# Patient Record
Sex: MALE | Race: WHITE | NOT HISPANIC OR LATINO | Employment: OTHER | URBAN - METROPOLITAN AREA
[De-identification: names, ages, dates, MRNs, and addresses within clinical notes are randomized per-mention and may not be internally consistent; named-entity substitution may affect disease eponyms.]

---

## 2018-01-07 ENCOUNTER — HOSPITAL ENCOUNTER (INPATIENT)
Facility: MEDICAL CENTER | Age: 53
LOS: 4 days | DRG: 193 | End: 2018-01-11
Attending: EMERGENCY MEDICINE | Admitting: HOSPITALIST
Payer: MEDICARE

## 2018-01-07 ENCOUNTER — RESOLUTE PROFESSIONAL BILLING HOSPITAL PROF FEE (OUTPATIENT)
Dept: HOSPITALIST | Facility: MEDICAL CENTER | Age: 53
End: 2018-01-07
Payer: MEDICARE

## 2018-01-07 ENCOUNTER — APPOINTMENT (OUTPATIENT)
Dept: RADIOLOGY | Facility: MEDICAL CENTER | Age: 53
DRG: 193 | End: 2018-01-07
Attending: EMERGENCY MEDICINE
Payer: MEDICARE

## 2018-01-07 DIAGNOSIS — E10.10 DIABETIC KETOACIDOSIS WITHOUT COMA ASSOCIATED WITH TYPE 1 DIABETES MELLITUS (HCC): ICD-10-CM

## 2018-01-07 DIAGNOSIS — J40 BRONCHITIS: ICD-10-CM

## 2018-01-07 DIAGNOSIS — R05.9 COUGH: ICD-10-CM

## 2018-01-07 PROBLEM — J06.9 VIRAL UPPER RESPIRATORY ILLNESS: Status: ACTIVE | Noted: 2018-01-07

## 2018-01-07 PROBLEM — R09.02 HYPOXIA: Status: ACTIVE | Noted: 2018-01-07

## 2018-01-07 LAB
ALBUMIN SERPL BCP-MCNC: 3.8 G/DL (ref 3.2–4.9)
ALBUMIN/GLOB SERPL: 1.1 G/DL
ALP SERPL-CCNC: 72 U/L (ref 30–99)
ALT SERPL-CCNC: 6 U/L (ref 2–50)
ANION GAP SERPL CALC-SCNC: 17 MMOL/L (ref 0–11.9)
AST SERPL-CCNC: 17 U/L (ref 12–45)
B-OH-BUTYR SERPL-MCNC: 5.56 MMOL/L (ref 0.02–0.27)
BASOPHILS # BLD AUTO: 0.3 % (ref 0–1.8)
BASOPHILS # BLD: 0.02 K/UL (ref 0–0.12)
BILIRUB SERPL-MCNC: 0.6 MG/DL (ref 0.1–1.5)
BNP SERPL-MCNC: 9 PG/ML (ref 0–100)
BUN SERPL-MCNC: 17 MG/DL (ref 8–22)
CALCIUM SERPL-MCNC: 9 MG/DL (ref 8.5–10.5)
CHLORIDE SERPL-SCNC: 96 MMOL/L (ref 96–112)
CO2 SERPL-SCNC: 18 MMOL/L (ref 20–33)
CREAT SERPL-MCNC: 1.02 MG/DL (ref 0.5–1.4)
EKG IMPRESSION: NORMAL
EOSINOPHIL # BLD AUTO: 0.04 K/UL (ref 0–0.51)
EOSINOPHIL NFR BLD: 0.6 % (ref 0–6.9)
ERYTHROCYTE [DISTWIDTH] IN BLOOD BY AUTOMATED COUNT: 44.1 FL (ref 35.9–50)
GFR SERPL CREATININE-BSD FRML MDRD: >60 ML/MIN/1.73 M 2
GLOBULIN SER CALC-MCNC: 3.5 G/DL (ref 1.9–3.5)
GLUCOSE SERPL-MCNC: 190 MG/DL (ref 65–99)
HCT VFR BLD AUTO: 46.7 % (ref 42–52)
HGB BLD-MCNC: 15.6 G/DL (ref 14–18)
IMM GRANULOCYTES # BLD AUTO: 0.04 K/UL (ref 0–0.11)
IMM GRANULOCYTES NFR BLD AUTO: 0.6 % (ref 0–0.9)
LYMPHOCYTES # BLD AUTO: 0.85 K/UL (ref 1–4.8)
LYMPHOCYTES NFR BLD: 12.4 % (ref 22–41)
MCH RBC QN AUTO: 28.4 PG (ref 27–33)
MCHC RBC AUTO-ENTMCNC: 33.4 G/DL (ref 33.7–35.3)
MCV RBC AUTO: 85.1 FL (ref 81.4–97.8)
MONOCYTES # BLD AUTO: 0.64 K/UL (ref 0–0.85)
MONOCYTES NFR BLD AUTO: 9.4 % (ref 0–13.4)
NEUTROPHILS # BLD AUTO: 5.25 K/UL (ref 1.82–7.42)
NEUTROPHILS NFR BLD: 76.7 % (ref 44–72)
NRBC # BLD AUTO: 0 K/UL
NRBC BLD-RTO: 0 /100 WBC
PLATELET # BLD AUTO: 321 K/UL (ref 164–446)
PMV BLD AUTO: 9.5 FL (ref 9–12.9)
POTASSIUM SERPL-SCNC: 4.1 MMOL/L (ref 3.6–5.5)
PROT SERPL-MCNC: 7.3 G/DL (ref 6–8.2)
RBC # BLD AUTO: 5.49 M/UL (ref 4.7–6.1)
SODIUM SERPL-SCNC: 131 MMOL/L (ref 135–145)
TROPONIN I SERPL-MCNC: <0.01 NG/ML (ref 0–0.04)
WBC # BLD AUTO: 6.8 K/UL (ref 4.8–10.8)

## 2018-01-07 PROCEDURE — 93005 ELECTROCARDIOGRAM TRACING: CPT

## 2018-01-07 PROCEDURE — 99223 1ST HOSP IP/OBS HIGH 75: CPT | Mod: AI | Performed by: HOSPITALIST

## 2018-01-07 PROCEDURE — 80053 COMPREHEN METABOLIC PANEL: CPT

## 2018-01-07 PROCEDURE — 84484 ASSAY OF TROPONIN QUANT: CPT

## 2018-01-07 PROCEDURE — 71045 X-RAY EXAM CHEST 1 VIEW: CPT

## 2018-01-07 PROCEDURE — 770001 HCHG ROOM/CARE - MED/SURG/GYN PRIV*

## 2018-01-07 PROCEDURE — 80048 BASIC METABOLIC PNL TOTAL CA: CPT

## 2018-01-07 PROCEDURE — 82010 KETONE BODYS QUAN: CPT

## 2018-01-07 PROCEDURE — 85025 COMPLETE CBC W/AUTO DIFF WBC: CPT

## 2018-01-07 PROCEDURE — 700111 HCHG RX REV CODE 636 W/ 250 OVERRIDE (IP): Performed by: EMERGENCY MEDICINE

## 2018-01-07 PROCEDURE — 96374 THER/PROPH/DIAG INJ IV PUSH: CPT

## 2018-01-07 PROCEDURE — 87040 BLOOD CULTURE FOR BACTERIA: CPT | Mod: 91

## 2018-01-07 PROCEDURE — 700105 HCHG RX REV CODE 258: Performed by: EMERGENCY MEDICINE

## 2018-01-07 PROCEDURE — 93005 ELECTROCARDIOGRAM TRACING: CPT | Performed by: EMERGENCY MEDICINE

## 2018-01-07 PROCEDURE — 96361 HYDRATE IV INFUSION ADD-ON: CPT

## 2018-01-07 PROCEDURE — 99285 EMERGENCY DEPT VISIT HI MDM: CPT

## 2018-01-07 PROCEDURE — 83880 ASSAY OF NATRIURETIC PEPTIDE: CPT

## 2018-01-07 PROCEDURE — 71045 X-RAY EXAM CHEST 1 VIEW: CPT | Performed by: EMERGENCY MEDICINE

## 2018-01-07 PROCEDURE — 87502 INFLUENZA DNA AMP PROBE: CPT

## 2018-01-07 RX ORDER — ENALAPRILAT 1.25 MG/ML
1.25 INJECTION INTRAVENOUS EVERY 6 HOURS PRN
Status: DISCONTINUED | OUTPATIENT
Start: 2018-01-07 | End: 2018-01-11 | Stop reason: HOSPADM

## 2018-01-07 RX ORDER — ONDANSETRON 2 MG/ML
4 INJECTION INTRAMUSCULAR; INTRAVENOUS ONCE
Status: COMPLETED | OUTPATIENT
Start: 2018-01-07 | End: 2018-01-07

## 2018-01-07 RX ORDER — FAMOTIDINE 20 MG/1
20 TABLET, FILM COATED ORAL 2 TIMES DAILY
Status: DISCONTINUED | OUTPATIENT
Start: 2018-01-08 | End: 2018-01-11 | Stop reason: HOSPADM

## 2018-01-07 RX ORDER — SODIUM CHLORIDE 9 MG/ML
1000 INJECTION, SOLUTION INTRAVENOUS ONCE
Status: COMPLETED | OUTPATIENT
Start: 2018-01-07 | End: 2018-01-07

## 2018-01-07 RX ORDER — DEXTROSE MONOHYDRATE 25 G/50ML
25 INJECTION, SOLUTION INTRAVENOUS
Status: DISCONTINUED | OUTPATIENT
Start: 2018-01-07 | End: 2018-01-09 | Stop reason: ALTCHOICE

## 2018-01-07 RX ORDER — ACETAMINOPHEN 325 MG/1
650 TABLET ORAL EVERY 6 HOURS PRN
Status: DISCONTINUED | OUTPATIENT
Start: 2018-01-07 | End: 2018-01-08

## 2018-01-07 RX ORDER — POLYETHYLENE GLYCOL 3350 17 G/17G
1 POWDER, FOR SOLUTION ORAL
Status: DISCONTINUED | OUTPATIENT
Start: 2018-01-07 | End: 2018-01-11 | Stop reason: HOSPADM

## 2018-01-07 RX ORDER — SODIUM CHLORIDE 9 MG/ML
INJECTION, SOLUTION INTRAVENOUS CONTINUOUS
Status: DISCONTINUED | OUTPATIENT
Start: 2018-01-08 | End: 2018-01-10 | Stop reason: ALTCHOICE

## 2018-01-07 RX ORDER — BISACODYL 10 MG
10 SUPPOSITORY, RECTAL RECTAL
Status: DISCONTINUED | OUTPATIENT
Start: 2018-01-07 | End: 2018-01-11 | Stop reason: HOSPADM

## 2018-01-07 RX ORDER — AMOXICILLIN 250 MG
2 CAPSULE ORAL 2 TIMES DAILY
Status: DISCONTINUED | OUTPATIENT
Start: 2018-01-08 | End: 2018-01-11 | Stop reason: HOSPADM

## 2018-01-07 RX ORDER — OXYCODONE HYDROCHLORIDE AND ACETAMINOPHEN 5; 325 MG/1; MG/1
1-2 TABLET ORAL EVERY 4 HOURS PRN
Status: DISCONTINUED | OUTPATIENT
Start: 2018-01-07 | End: 2018-01-08

## 2018-01-07 RX ORDER — GUAIFENESIN/DEXTROMETHORPHAN 100-10MG/5
10 SYRUP ORAL EVERY 6 HOURS PRN
Status: DISCONTINUED | OUTPATIENT
Start: 2018-01-07 | End: 2018-01-11 | Stop reason: HOSPADM

## 2018-01-07 RX ORDER — ONDANSETRON 2 MG/ML
4 INJECTION INTRAMUSCULAR; INTRAVENOUS EVERY 4 HOURS PRN
Status: DISCONTINUED | OUTPATIENT
Start: 2018-01-07 | End: 2018-01-11 | Stop reason: HOSPADM

## 2018-01-07 RX ORDER — CARISOPRODOL 350 MG/1
350 TABLET ORAL EVERY 8 HOURS PRN
Status: DISCONTINUED | OUTPATIENT
Start: 2018-01-07 | End: 2018-01-08

## 2018-01-07 RX ORDER — AZITHROMYCIN 250 MG/1
500 TABLET, FILM COATED ORAL DAILY
Status: DISCONTINUED | OUTPATIENT
Start: 2018-01-08 | End: 2018-01-11 | Stop reason: HOSPADM

## 2018-01-07 RX ORDER — ENALAPRIL MALEATE 20 MG/1
20 TABLET ORAL DAILY
Status: DISCONTINUED | OUTPATIENT
Start: 2018-01-08 | End: 2018-01-08

## 2018-01-07 RX ORDER — INSULIN GLARGINE 100 [IU]/ML
40 INJECTION, SOLUTION SUBCUTANEOUS DAILY
Status: DISCONTINUED | OUTPATIENT
Start: 2018-01-08 | End: 2018-01-08

## 2018-01-07 RX ADMIN — ONDANSETRON 4 MG: 2 INJECTION INTRAMUSCULAR; INTRAVENOUS at 22:31

## 2018-01-07 RX ADMIN — SODIUM CHLORIDE 1000 ML: 9 INJECTION, SOLUTION INTRAVENOUS at 22:31

## 2018-01-07 ASSESSMENT — PAIN SCALES - GENERAL: PAINLEVEL_OUTOF10: 6

## 2018-01-08 PROBLEM — J10.1 INFLUENZA B: Status: ACTIVE | Noted: 2018-01-07

## 2018-01-08 PROBLEM — J96.01 ACUTE RESPIRATORY FAILURE WITH HYPOXIA (HCC): Status: ACTIVE | Noted: 2018-01-07

## 2018-01-08 LAB
ANION GAP SERPL CALC-SCNC: 15 MMOL/L (ref 0–11.9)
ANION GAP SERPL CALC-SCNC: 17 MMOL/L (ref 0–11.9)
BUN SERPL-MCNC: 17 MG/DL (ref 8–22)
BUN SERPL-MCNC: 19 MG/DL (ref 8–22)
CALCIUM SERPL-MCNC: 7.8 MG/DL (ref 8.5–10.5)
CALCIUM SERPL-MCNC: 8 MG/DL (ref 8.5–10.5)
CHLORIDE SERPL-SCNC: 101 MMOL/L (ref 96–112)
CHLORIDE SERPL-SCNC: 103 MMOL/L (ref 96–112)
CO2 SERPL-SCNC: 14 MMOL/L (ref 20–33)
CO2 SERPL-SCNC: 17 MMOL/L (ref 20–33)
CREAT SERPL-MCNC: 0.79 MG/DL (ref 0.5–1.4)
CREAT SERPL-MCNC: 0.94 MG/DL (ref 0.5–1.4)
ERYTHROCYTE [DISTWIDTH] IN BLOOD BY AUTOMATED COUNT: 44.2 FL (ref 35.9–50)
EST. AVERAGE GLUCOSE BLD GHB EST-MCNC: 249 MG/DL
FLUAV RNA SPEC QL NAA+PROBE: NEGATIVE
FLUBV RNA SPEC QL NAA+PROBE: POSITIVE
GFR SERPL CREATININE-BSD FRML MDRD: >60 ML/MIN/1.73 M 2
GFR SERPL CREATININE-BSD FRML MDRD: >60 ML/MIN/1.73 M 2
GLUCOSE BLD-MCNC: 103 MG/DL (ref 65–99)
GLUCOSE BLD-MCNC: 179 MG/DL (ref 65–99)
GLUCOSE BLD-MCNC: 181 MG/DL (ref 65–99)
GLUCOSE BLD-MCNC: 192 MG/DL (ref 65–99)
GLUCOSE BLD-MCNC: 202 MG/DL (ref 65–99)
GLUCOSE SERPL-MCNC: 194 MG/DL (ref 65–99)
GLUCOSE SERPL-MCNC: 214 MG/DL (ref 65–99)
HBA1C MFR BLD: 10.3 % (ref 0–5.6)
HCT VFR BLD AUTO: 43.9 % (ref 42–52)
HGB BLD-MCNC: 14.2 G/DL (ref 14–18)
MCH RBC QN AUTO: 27.4 PG (ref 27–33)
MCHC RBC AUTO-ENTMCNC: 32.3 G/DL (ref 33.7–35.3)
MCV RBC AUTO: 84.6 FL (ref 81.4–97.8)
PLATELET # BLD AUTO: 289 K/UL (ref 164–446)
PMV BLD AUTO: 9.3 FL (ref 9–12.9)
POTASSIUM SERPL-SCNC: 3.9 MMOL/L (ref 3.6–5.5)
POTASSIUM SERPL-SCNC: 3.9 MMOL/L (ref 3.6–5.5)
RBC # BLD AUTO: 5.19 M/UL (ref 4.7–6.1)
SODIUM SERPL-SCNC: 133 MMOL/L (ref 135–145)
SODIUM SERPL-SCNC: 134 MMOL/L (ref 135–145)
WBC # BLD AUTO: 9.3 K/UL (ref 4.8–10.8)

## 2018-01-08 PROCEDURE — 85027 COMPLETE CBC AUTOMATED: CPT

## 2018-01-08 PROCEDURE — 90686 IIV4 VACC NO PRSV 0.5 ML IM: CPT | Performed by: HOSPITALIST

## 2018-01-08 PROCEDURE — 770001 HCHG ROOM/CARE - MED/SURG/GYN PRIV*

## 2018-01-08 PROCEDURE — 3E01340 INTRODUCTION OF INFLUENZA VACCINE INTO SUBCUTANEOUS TISSUE, PERCUTANEOUS APPROACH: ICD-10-PCS | Performed by: HOSPITALIST

## 2018-01-08 PROCEDURE — 700102 HCHG RX REV CODE 250 W/ 637 OVERRIDE(OP): Performed by: EMERGENCY MEDICINE

## 2018-01-08 PROCEDURE — A9270 NON-COVERED ITEM OR SERVICE: HCPCS | Performed by: HOSPITALIST

## 2018-01-08 PROCEDURE — A9270 NON-COVERED ITEM OR SERVICE: HCPCS | Performed by: EMERGENCY MEDICINE

## 2018-01-08 PROCEDURE — 80048 BASIC METABOLIC PNL TOTAL CA: CPT

## 2018-01-08 PROCEDURE — 99233 SBSQ HOSP IP/OBS HIGH 50: CPT | Performed by: INTERNAL MEDICINE

## 2018-01-08 PROCEDURE — 36415 COLL VENOUS BLD VENIPUNCTURE: CPT

## 2018-01-08 PROCEDURE — 700105 HCHG RX REV CODE 258: Performed by: INTERNAL MEDICINE

## 2018-01-08 PROCEDURE — 700111 HCHG RX REV CODE 636 W/ 250 OVERRIDE (IP): Performed by: HOSPITALIST

## 2018-01-08 PROCEDURE — A9270 NON-COVERED ITEM OR SERVICE: HCPCS | Performed by: INTERNAL MEDICINE

## 2018-01-08 PROCEDURE — 700105 HCHG RX REV CODE 258: Performed by: HOSPITALIST

## 2018-01-08 PROCEDURE — 83036 HEMOGLOBIN GLYCOSYLATED A1C: CPT

## 2018-01-08 PROCEDURE — 700102 HCHG RX REV CODE 250 W/ 637 OVERRIDE(OP): Performed by: HOSPITALIST

## 2018-01-08 PROCEDURE — 90471 IMMUNIZATION ADMIN: CPT

## 2018-01-08 PROCEDURE — 700102 HCHG RX REV CODE 250 W/ 637 OVERRIDE(OP): Performed by: INTERNAL MEDICINE

## 2018-01-08 PROCEDURE — 82962 GLUCOSE BLOOD TEST: CPT

## 2018-01-08 RX ORDER — INSULIN GLARGINE 100 [IU]/ML
15 INJECTION, SOLUTION SUBCUTANEOUS DAILY
Status: DISCONTINUED | OUTPATIENT
Start: 2018-01-08 | End: 2018-01-08

## 2018-01-08 RX ORDER — BENZONATATE 100 MG/1
100 CAPSULE ORAL 3 TIMES DAILY PRN
Status: DISCONTINUED | OUTPATIENT
Start: 2018-01-08 | End: 2018-01-11 | Stop reason: HOSPADM

## 2018-01-08 RX ORDER — ALUMINA, MAGNESIA, AND SIMETHICONE 2400; 2400; 240 MG/30ML; MG/30ML; MG/30ML
10 SUSPENSION ORAL 4 TIMES DAILY PRN
Status: DISCONTINUED | OUTPATIENT
Start: 2018-01-08 | End: 2018-01-11 | Stop reason: HOSPADM

## 2018-01-08 RX ORDER — GUAIFENESIN 600 MG/1
600 TABLET, EXTENDED RELEASE ORAL EVERY 12 HOURS
Status: DISCONTINUED | OUTPATIENT
Start: 2018-01-08 | End: 2018-01-11 | Stop reason: HOSPADM

## 2018-01-08 RX ORDER — INSULIN GLARGINE 100 [IU]/ML
20 INJECTION, SOLUTION SUBCUTANEOUS DAILY
Status: DISCONTINUED | OUTPATIENT
Start: 2018-01-09 | End: 2018-01-09

## 2018-01-08 RX ORDER — BENZONATATE 100 MG/1
100 CAPSULE ORAL 3 TIMES DAILY PRN
Status: DISCONTINUED | OUTPATIENT
Start: 2018-01-08 | End: 2018-01-08

## 2018-01-08 RX ORDER — OSELTAMIVIR PHOSPHATE 75 MG/1
75 CAPSULE ORAL EVERY 12 HOURS
Status: DISCONTINUED | OUTPATIENT
Start: 2018-01-08 | End: 2018-01-11 | Stop reason: HOSPADM

## 2018-01-08 RX ORDER — OXYCODONE HYDROCHLORIDE 5 MG/1
2.5-5 TABLET ORAL EVERY 4 HOURS PRN
Status: DISCONTINUED | OUTPATIENT
Start: 2018-01-08 | End: 2018-01-11 | Stop reason: HOSPADM

## 2018-01-08 RX ADMIN — ALUMINUM HYDROXIDE, MAGNESIUM HYDROXIDE,SIMETHICONE 10 ML: 400; 400; 40 LIQUID ORAL at 04:25

## 2018-01-08 RX ADMIN — GUAIFENESIN 600 MG: 600 TABLET, EXTENDED RELEASE ORAL at 09:00

## 2018-01-08 RX ADMIN — OSELTAMIVIR PHOSPHATE 75 MG: 75 CAPSULE ORAL at 03:01

## 2018-01-08 RX ADMIN — OXYCODONE HYDROCHLORIDE 5 MG: 5 TABLET ORAL at 18:14

## 2018-01-08 RX ADMIN — GUAIFENESIN AND DEXTROMETHORPHAN 10 ML: 100; 10 SYRUP ORAL at 13:24

## 2018-01-08 RX ADMIN — FAMOTIDINE 20 MG: 20 TABLET, FILM COATED ORAL at 07:40

## 2018-01-08 RX ADMIN — ONDANSETRON 4 MG: 2 INJECTION INTRAMUSCULAR; INTRAVENOUS at 04:18

## 2018-01-08 RX ADMIN — GUAIFENESIN 600 MG: 600 TABLET, EXTENDED RELEASE ORAL at 21:00

## 2018-01-08 RX ADMIN — ONDANSETRON 4 MG: 2 INJECTION INTRAMUSCULAR; INTRAVENOUS at 20:39

## 2018-01-08 RX ADMIN — INFLUENZA A VIRUS A/MICHIGAN/45/2015 X-275 (H1N1) ANTIGEN (FORMALDEHYDE INACTIVATED), INFLUENZA A VIRUS A/HONG KONG/4801/2014 X-263B (H3N2) ANTIGEN (FORMALDEHYDE INACTIVATED), INFLUENZA B VIRUS B/PHUKET/3073/2013 ANTIGEN (FORMALDEHYDE INACTIVATED), AND INFLUENZA B VIRUS B/BRISBANE/60/2008 ANTIGEN (FORMALDEHYDE INACTIVATED) 0.5 ML: 15; 15; 15; 15 INJECTION, SUSPENSION INTRAMUSCULAR at 07:40

## 2018-01-08 RX ADMIN — SODIUM CHLORIDE: 9 INJECTION, SOLUTION INTRAVENOUS at 18:15

## 2018-01-08 RX ADMIN — OXYCODONE HYDROCHLORIDE 5 MG: 5 TABLET ORAL at 13:23

## 2018-01-08 RX ADMIN — OXYCODONE HYDROCHLORIDE 5 MG: 5 TABLET ORAL at 22:14

## 2018-01-08 RX ADMIN — INSULIN GLARGINE 15 UNITS: 100 INJECTION, SOLUTION SUBCUTANEOUS at 09:54

## 2018-01-08 RX ADMIN — SODIUM CHLORIDE: 9 INJECTION, SOLUTION INTRAVENOUS at 11:32

## 2018-01-08 RX ADMIN — INSULIN HUMAN 2 UNITS: 100 INJECTION, SOLUTION PARENTERAL at 06:49

## 2018-01-08 RX ADMIN — ONDANSETRON 4 MG: 2 INJECTION INTRAMUSCULAR; INTRAVENOUS at 08:00

## 2018-01-08 RX ADMIN — OXYCODONE HYDROCHLORIDE 5 MG: 5 TABLET ORAL at 08:40

## 2018-01-08 RX ADMIN — INSULIN HUMAN 3 UNITS: 100 INJECTION, SOLUTION PARENTERAL at 16:16

## 2018-01-08 RX ADMIN — ONDANSETRON 4 MG: 2 INJECTION INTRAMUSCULAR; INTRAVENOUS at 16:12

## 2018-01-08 RX ADMIN — GUAIFENESIN AND DEXTROMETHORPHAN 10 ML: 100; 10 SYRUP ORAL at 22:28

## 2018-01-08 RX ADMIN — ALUMINUM HYDROXIDE, MAGNESIUM HYDROXIDE,SIMETHICONE 10 ML: 400; 400; 40 LIQUID ORAL at 01:50

## 2018-01-08 RX ADMIN — ENOXAPARIN SODIUM 40 MG: 100 INJECTION SUBCUTANEOUS at 07:40

## 2018-01-08 RX ADMIN — SODIUM CHLORIDE: 9 INJECTION, SOLUTION INTRAVENOUS at 03:09

## 2018-01-08 RX ADMIN — OSELTAMIVIR PHOSPHATE 75 MG: 75 CAPSULE ORAL at 21:00

## 2018-01-08 RX ADMIN — OSELTAMIVIR PHOSPHATE 75 MG: 75 CAPSULE ORAL at 08:40

## 2018-01-08 RX ADMIN — FAMOTIDINE 20 MG: 20 TABLET, FILM COATED ORAL at 21:58

## 2018-01-08 RX ADMIN — AZITHROMYCIN 500 MG: 250 TABLET, FILM COATED ORAL at 09:00

## 2018-01-08 ASSESSMENT — LIFESTYLE VARIABLES
EVER_SMOKED: YES
ALCOHOL_USE: NO
DO YOU DRINK ALCOHOL: NO
EVER_SMOKED: YES

## 2018-01-08 ASSESSMENT — ENCOUNTER SYMPTOMS
DEPRESSION: 0
BACK PAIN: 0
SORE THROAT: 1
ABDOMINAL PAIN: 0
CHILLS: 1
FEVER: 0
NAUSEA: 1
CHOKING: 1
SPUTUM PRODUCTION: 1
SHORTNESS OF BREATH: 1
DIZZINESS: 0
MYALGIAS: 0
NAUSEA: 0
DIARRHEA: 0
FATIGUE: 1
HEADACHES: 1
FEVER: 1
BLURRED VISION: 0
COUGH: 1

## 2018-01-08 ASSESSMENT — COPD QUESTIONNAIRES
HAVE YOU SMOKED AT LEAST 100 CIGARETTES IN YOUR ENTIRE LIFE: YES
DO YOU EVER COUGH UP ANY MUCUS OR PHLEGM?: YES, A FEW DAYS A WEEK OR MONTH
COPD SCREENING SCORE: 4
DURING THE PAST 4 WEEKS HOW MUCH DID YOU FEEL SHORT OF BREATH: NONE/LITTLE OF THE TIME

## 2018-01-08 ASSESSMENT — PAIN SCALES - GENERAL
PAINLEVEL_OUTOF10: 4
PAINLEVEL_OUTOF10: 6
PAINLEVEL_OUTOF10: 6
PAINLEVEL_OUTOF10: 4
PAINLEVEL_OUTOF10: 7
PAINLEVEL_OUTOF10: 6

## 2018-01-08 NOTE — PROGRESS NOTES
Aox4. Currently on 2L O2 at 95%. Positive for SOB. Nauseous. Vomited once. Zofran given. Pt complaining upset stomach. Pt claims he has hx of GERD and takes ranitidine. Maalox given per order with some relief but does not last long. Reports body aches. Refuses to take anything for pain other than aspirin due to his liver issue. BLE amputation noted. Pt reports ability to ambulate steady using prosthetics. Two RN skin check with Torri. BLE stumps are pink but blanching. Other skin areas intact.

## 2018-01-08 NOTE — ED NOTES
Critical lab value received from microbiology:    Influenza B positive    Droplet precautions initiated.

## 2018-01-08 NOTE — DIETARY
"Nutrition Services:      Unintentional weight loss PTA on Nutrition Admit Screen. Pt is currently on a Diabetic diet and pt denied any recent weight changes. Noted per chart review that pt's weight from previous admit 3 years ago is 61 kg, which indicates the pt's weight is stable.      Ht: 5'8\", Wt: 60 kg, BMI 20.  Consult RD as needed. RD will re-screen weekly.      RD available prn     "

## 2018-01-08 NOTE — ED NOTES
"Pt amb to triage.  Chief Complaint   Patient presents with   • Shortness of Breath     x3d   • Cough     x3d, barky   • Chest Wall Pain     \"It hurts when I breathe, I cannot take a deep breath\"   • Medication Refill     New to [facility], requesting rx for insulin and needle tips for insulin pen     Pt speaking in complete sentences. Mask provided.  "

## 2018-01-08 NOTE — ED PROVIDER NOTES
"ED Provider Note  ED Provider Note          CHIEF COMPLAINT  Chief Complaint   Patient presents with   • Shortness of Breath     x3d   • Cough     x3d, barky   • Chest Wall Pain     \"It hurts when I breathe, I cannot take a deep breath\"   • Medication Refill     New to [facility], requesting rx for insulin and needle tips for insulin pen       HPI  Juvenal Boone is a 52 y.o. male who presents to the Emergency Department For concern of a productive cough for about 3 days with associated shortness of breath. He has diffuse bodyaches and chest wall pain. He is a type I diabetic and is been running low on his insulin as well. He says he has congestion, sore throat. He says his sugars are normally very well-controlled. He said he has not had DKA in 40 years. He says he is now starting to feel little nauseous spell when asking for a bag to throw up in.    REVIEW OF SYSTEMS  Review of Systems   Constitutional: Positive for fatigue. Negative for fever.   HENT: Positive for congestion and sore throat.    Respiratory: Positive for choking and shortness of breath.    Gastrointestinal: Positive for nausea. Negative for abdominal pain.   Endocrine:        H/oDM   Genitourinary: Negative for difficulty urinating.   Musculoskeletal: Negative for back pain.   Skin: Negative for rash.   Neurological: Negative for dizziness.       PAST MEDICAL HISTORY   has a past medical history of Crush injury of leg; Diabetes; Gastroparesis; Osteomyelitis; and Traumatic amputation of leg(s) (complete) (partial), unilateral, below knee, without mention of complication.    SURGICAL HISTORY   has a past surgical history that includes cholecystectomy; other orthopedic surgery; and other.    SOCIAL HISTORY  Social History   Substance Use Topics   • Smoking status: Former Smoker     Years: 0.50     Quit date: 1/29/1988   • Smokeless tobacco: Not on file   • Alcohol use No      History   Drug Use No       FAMILY HISTORY  No family history on " "file.    CURRENT MEDICATIONS  Reviewed.  See Encounter Summary.     ALLERGIES  Allergies   Allergen Reactions   • Ativan Itching   • Benadryl [Altaryl]      \"make my skin crawl\"   • Demerol    • Fentanyl Anxiety     \"I go crazy\".    • Morphine      \"I go in to severe anaphylactic shock, I stop breathing\".   • Other Misc      \"I'm allergic to all pain medications except dilaudid and plain oxycodone\"   • Phenergan [Promethazine Hcl]      \"make my skin crawl\"   • Reglan [Metoclopramide Hcl]      \"involuntary tongue movements\"   • Toradol Rash and Itching   • Tylenol Itching       PHYSICAL EXAM  VITAL SIGNS: /85   Pulse 94   Temp 36.5 °C (97.7 °F) (Temporal)   Resp (!) 23   Ht 1.727 m (5' 8\")   Wt 60 kg (132 lb 4.4 oz)   SpO2 96%   BMI 20.11 kg/m²   Physical Exam   Constitutional: He is oriented to person, place, and time.   HENT:   Head: Normocephalic and atraumatic.   Eyes: Pupils are equal, round, and reactive to light.   Neck: Normal range of motion.   Cardiovascular: Normal rate.    Pulmonary/Chest: Effort normal and breath sounds normal.   Abdominal: Soft. Bowel sounds are normal.   Musculoskeletal: He exhibits no deformity.   Neurological: He is alert and oriented to person, place, and time.   Skin: Skin is warm. He is not diaphoretic.   Psychiatric: He has a normal mood and affect.           DIAGNOSTIC STUDIES / PROCEDURES     LABS  Labs Reviewed   CBC WITH DIFFERENTIAL - Abnormal; Notable for the following:        Result Value    MCHC 33.4 (*)     Neutrophils-Polys 76.70 (*)     Lymphocytes 12.40 (*)     Lymphs (Absolute) 0.85 (*)     All other components within normal limits   COMP METABOLIC PANEL - Abnormal; Notable for the following:     Sodium 131 (*)     Co2 18 (*)     Anion Gap 17.0 (*)     Glucose 190 (*)     All other components within normal limits   BASIC METABOLIC PANEL - Abnormal; Notable for the following:     Sodium 133 (*)     Co2 17 (*)     Glucose 194 (*)     Calcium 8.0 (*)     " "Anion Gap 15.0 (*)     All other components within normal limits    Narrative:     Repeat after IVF  This quantitative reference lab test has a TAT of 24-48  hours. Please order Beta-Hydroxybutyric Acid ([id number]) for  STAT ketone quantitation performed at [facility]   INFLUENZA A/B BY PCR - Abnormal; Notable for the following:     Influenza virus B, PCR POSITIVE (*)     All other components within normal limits   BETA-HYDROXYBUTYRIC ACID - Abnormal; Notable for the following:     beta-Hydroxybutyric Acid 5.56 (*)     All other components within normal limits    Narrative:     Repeat after IVF  This quantitative reference lab test has a TAT of 24-48  hours. Please order Beta-Hydroxybutyric Acid ([id number]) for  STAT ketone quantitation performed at [facility]   BTYPE NATRIURETIC PEPTIDE   BLOOD CULTURE    Narrative:     Per Hospital Policy: Only change Specimen Src: to \"Line\" if  specified by physician order.   TROPONIN   ESTIMATED GFR   ESTIMATED GFR    Narrative:     Repeat after IVF  This quantitative reference lab test has a TAT of 24-48  hours. Please order Beta-Hydroxybutyric Acid ([id number]) for  STAT ketone quantitation performed at [facility]   BLOOD CULTURE   CBC WITHOUT DIFFERENTIAL   BASIC METABOLIC PANEL   HEMOGLOBIN A1C       All labs were reviewed by me.        RADIOLOGY  DX-CHEST-LIMITED (1 VIEW)   Final Result      No acute cardiopulmonary disease.        The radiologist's interpretation of all radiological studies have been reviewed by me.    COURSE & MEDICAL DECISION MAKING  Pertinent Labs & Imaging studies reviewed. (See chart for details)    10:17 PM - Patient seen and examined at bedside.       Decision Making:  This is a 52 y.o. year old male who presents with Concern of a cough, congestion and other upper respiratory like symptoms. He has a productive sounding cough here but his lungs sound clear. He is a type I diabetic that is not been in DKA for at least 40 " years. He is very tightly controlled. However on his labs here he does have an anion gap as well as his bicarb is slightly low. He also was positive for ketones in his serum. Because of his cough, congestion and presentation I am suspicious for the flu. He was positive for influenza B. While he is being watched on the monitor he did have some desaturations down to 88% on room air. He was given 2 L IV fluid normal saline bolus. Repeat BMP does show still a gap now 15 and a bicarb of 17. He is admitted to the hospitalist for concern of partially treated DKA, influenza with hypoxemia.    DISPOSITION:  Admitted     FINAL IMPRESSION  1. Cough    2. Diabetic ketoacidosis without coma associated with type 1 diabetes mellitus (CMS-HCC)    3. Bronchitis

## 2018-01-08 NOTE — H&P
" Hospital Medicine History and Physical    Date of Service  1/7/2018    Chief Complaint  Chief Complaint   Patient presents with   • Shortness of Breath     x3d   • Cough     x3d, barky   • Chest Wall Pain     \"It hurts when I breathe, I cannot take a deep breath\"   • Medication Refill     New to [facility], requesting rx for insulin and needle tips for insulin pen       History of Presenting Illness  52 y.o. male who presented 1/7/2018 with 2 day history of worsening cough and mild shortness of breath subjective fever and malaise. Workup in emergency room concerning for influenza. Patient has a history of diabetes.      Primary Care Physician  Zhao Mckenzie M.D.    Consultants  none    Code Status  FULL    Review of Systems  Review of Systems   Constitutional: Positive for chills, fever and malaise/fatigue.   HENT: Positive for sore throat. Negative for congestion.    Eyes: Negative for blurred vision.   Respiratory: Positive for cough, sputum production and shortness of breath.    Cardiovascular: Negative for chest pain and leg swelling.   Gastrointestinal: Negative for abdominal pain, diarrhea and nausea.   Genitourinary: Negative for dysuria and hematuria.   Musculoskeletal: Negative for back pain, joint pain and myalgias.   Skin: Negative for rash.   Neurological: Positive for headaches. Negative for dizziness.   Psychiatric/Behavioral: Negative for depression.        Past Medical History  Past Medical History:   Diagnosis Date   • Crush injury of leg     right   • Diabetes    • Gastroparesis    • Osteomyelitis     leg   • Traumatic amputation of leg(s) (complete) (partial), unilateral, below knee, without mention of complication     left below knee and right foot       Surgical History  Past Surgical History:   Procedure Laterality Date   • CHOLECYSTECTOMY     • OTHER      bilateral eye surgery   • OTHER ORTHOPEDIC SURGERY      bilateral BKA       Medications  No current facility-administered medications on file " "prior to encounter.      Current Outpatient Prescriptions on File Prior to Encounter   Medication Sig Dispense Refill   • oxycodone-acetaminophen (PERCOCET) 5-325 MG TABS Take 1-2 Tabs by mouth every four hours as needed (pain). 20 Each 0   • carisoprodol (SOMA) 350 MG TABS Take 1 Tab by mouth every 8 hours as needed for Muscle Spasms. 18 Each 0   • ranitidine (ZANTAC) 150 MG TABS Take 150 mg by mouth 2 times a day.     • insulin regular (1 UNIT/ML) 1 unit/mL SOLN Inject  as instructed as needed.     • enalapril (VASOTEC) 20 MG tablet Take 1 Tab by mouth every day. 30 Tab 0   • insulin glargine (LANTUS) 100 UNIT/ML SOLN Inject 40 Units as instructed every day. Indications: Insulin-Dependent Diabetes 10 mL 0       Family History  Both parents alive and well.    Social History  Social History   Substance Use Topics   • Smoking status: Former Smoker     Years: 0.50     Quit date: 1988   • Smokeless tobacco: Not on file   • Alcohol use No       Allergies  Allergies   Allergen Reactions   • Ativan Itching   • Benadryl [Altaryl]      \"make my skin crawl\"   • Demerol    • Fentanyl Anxiety     \"I go crazy\".    • Morphine      \"I go in to severe anaphylactic shock, I stop breathing\".   • Other Misc      \"I'm allergic to all pain medications except dilaudid and plain oxycodone\"   • Phenergan [Promethazine Hcl]      \"make my skin crawl\"   • Reglan [Metoclopramide Hcl]      \"involuntary tongue movements\"   • Toradol Rash and Itching   • Tylenol Itching        Physical Exam  Laboratory   Hemodynamics  Temp (24hrs), Av.5 °C (97.7 °F), Min:36.5 °C (97.7 °F), Max:36.5 °C (97.7 °F)   Temperature: 36.5 °C (97.7 °F)  Pulse  Av  Min: 94  Max: 102 Heart Rate (Monitored): 98  Blood Pressure: 127/85, NIBP: (!) 167/81      Respiratory      Respiration: (!) 23, Pulse Oximetry: 98 %             Physical Exam   Constitutional: He is oriented to person, place, and time. He appears well-developed and well-nourished. No distress. "   HENT:   Head: Normocephalic and atraumatic.   Nose: Nose normal.   Mouth/Throat: Oropharynx is clear and moist.   Eyes: Conjunctivae and EOM are normal. Right eye exhibits no discharge. Left eye exhibits no discharge. No scleral icterus.   Neck: No tracheal deviation present.   Cardiovascular: Normal rate, regular rhythm, normal heart sounds and intact distal pulses.    No murmur heard.  Pulses:       Radial pulses are 2+ on the right side, and 2+ on the left side.   Pulmonary/Chest: Effort normal. No respiratory distress. He has decreased breath sounds. He has no wheezes.   Abdominal: Soft. Bowel sounds are normal. He exhibits no distension. There is no tenderness.   Musculoskeletal: He exhibits no edema.   Bilateral below-knee amputations and has prosthesis on   Neurological: He is alert and oriented to person, place, and time. No cranial nerve deficit.   Skin: Skin is warm. He is not diaphoretic.   Psychiatric: He has a normal mood and affect. His behavior is normal. Thought content normal.   Vitals reviewed.      Recent Labs      01/07/18   1550   WBC  6.8   RBC  5.49   HEMOGLOBIN  15.6   HEMATOCRIT  46.7   MCV  85.1   MCH  28.4   MCHC  33.4*   RDW  44.1   PLATELETCT  321   MPV  9.5     Recent Labs      01/07/18   1550   SODIUM  131*   POTASSIUM  4.1   CHLORIDE  96   CO2  18*   GLUCOSE  190*   BUN  17   CREATININE  1.02   CALCIUM  9.0     Recent Labs      01/07/18   1550   ALTSGPT  6   ASTSGOT  17   ALKPHOSPHAT  72   TBILIRUBIN  0.6   GLUCOSE  190*         Recent Labs      01/07/18   1550   BNPBTYPENAT  9         Lab Results   Component Value Date    TROPONINI <0.01 01/07/2018     Urinalysis:    Lab Results  Component Value Date/Time   SPECGRAVITY 1.015 06/10/2013 0100   GLUCOSEUR >1000 (A) 06/10/2013 0100   KETONES 60 (A) 06/10/2013 0100   NITRITE Negative 06/10/2013 0100        Imaging  CXR: no acute cardiopulmonary findings.   Assessment/Plan     I anticipate this patient will require at least two midnights  for appropriate medical management, necessitating inpatient admission.    DM (diabetes mellitus) (CMS-HCC)- (present on admission)   Assessment & Plan    As above in DKA        Influenza B- (present on admission)   Assessment & Plan    Supportive care.  Await influenza by PCR testing.        Acute respiratory failure with hypoxia due to influenza B (CMS-HCC)   Assessment & Plan    Likely viral URI  Influenza B positive  Supportive care  Supplemental oxygen, antitussives  As needed bronchodilators w/ RT protocol        h/o Traumatic amputation of lower limb- bilat- (present on admission)   Assessment & Plan    Has prosthetic's        DKA (diabetic ketoacidoses) (CMS-HCC)- (present on admission)   Assessment & Plan    Elevated beta hydroxybuterate and anion gap  Recent URI likely etiology  Improving with IV fluids.  Conitnue with BMP monitoring and fluids  HgbA1c ordered.    diabetric diet            VTE prophylaxis: Lovenox .

## 2018-01-08 NOTE — ED NOTES
Med rec updated and complete.  Allergies reviewed.  Pt  Denies antibiotic use in last 30 days.  Removed all medications that pt stated he no   Longer is taking.

## 2018-01-08 NOTE — ASSESSMENT & PLAN NOTE
Continue Tamiflu  Patient had been ill for several days prior, Tamiflu may be ineffective  No evidence of secondary pneumonia based on Pro calcitonin

## 2018-01-08 NOTE — PROGRESS NOTES
"Patient [id number], insullin held patient not eating lunch states \"Im not hungry\", will notify hospitalist  "

## 2018-01-08 NOTE — PROGRESS NOTES
[facility] Hospitalist Progress Note    Date of Service: 2018    Chief Complaint  52 y.o. male with T1 DM with gastroparesis, admitted 2018 with SOB, cough, nausea, and chest wall pain. Labs showed anion gap and slightly low bicarbonate. B-HB was 5.56.  Influenza B positive. Had slight desaturations in the ED. Given IVF for mild DKA, which improved. Started on tamiflu.    Interval Problem Update  2018 - no overnight events. Remains hemodynamically stable and afebrile. Stable on 2L O2 NC. Tachycardia has improved. Repeat BMP showed AG 17, CO2 14. No leukocytosis. . CXR showed no acute consolidation.     > Seen and examined. Diffuse body aches, on chest, head, back.  (+) SOB, and cough, no phlegm. (+) nausea, but no vomiting, diarrhea.       Consultants/Specialty  None    Disposition  Monitor on the floors        ROS     Pertinent positives/negatives as mentioned above.     A complete review of systems was done. All other systems were negative.      Physical Exam  Laboratory/Imaging   Hemodynamics  Temp (24hrs), Av.3 °C (97.4 °F), Min:36.1 °C (97 °F), Max:36.5 °C (97.7 °F)   Temperature: 36.1 °C (97 °F)  Pulse  Av  Min: 87  Max: 102 Heart Rate (Monitored): 97  Blood Pressure: 149/88, NIBP: 120/79      Respiratory      Respiration: 20, Pulse Oximetry: 95 %, O2 Daily Delivery Respiratory : Nasal Cannula        RUL Breath Sounds: Clear, RML Breath Sounds: Clear, RLL Breath Sounds: Diminished, GABY Breath Sounds: Clear, LLL Breath Sounds: Diminished    Fluids  No intake or output data in the 24 hours ending 18 0804    Nutrition  Orders Placed This Encounter   Procedures   • Diet Order     Standing Status:   Standing     Number of Occurrences:   1     Order Specific Question:   Diet:     Answer:   Diabetic [3]     Physical Exam   Constitutional: He is oriented to person, place, and time. He appears well-developed and well-nourished. No distress.   HENT:   Head: Normocephalic and atraumatic.    Mouth/Throat: Oropharynx is clear and moist. No oropharyngeal exudate.   Eyes: EOM are normal. Pupils are equal, round, and reactive to light. Right eye exhibits no discharge. Left eye exhibits no discharge. No scleral icterus.   Neck: Normal range of motion. Neck supple. No thyromegaly present.   Cardiovascular: Normal rate and regular rhythm.  Exam reveals no gallop and no friction rub.    No murmur heard.  Pulmonary/Chest: Effort normal. He has no wheezes. He has no rales. He exhibits no tenderness.   Diminished air entry B/L bases, otherwise clear to auscultation   Abdominal: Soft. Bowel sounds are normal. There is no tenderness. There is no rebound and no guarding.   Musculoskeletal: Normal range of motion. He exhibits tenderness (diffuse muscle tenderness) and deformity (B/L LE amputations). He exhibits no edema.   Lymphadenopathy:     He has no cervical adenopathy.   Neurological: He is alert and oriented to person, place, and time. No cranial nerve deficit. Coordination normal.   Skin: Skin is warm and dry. No rash noted. He is not diaphoretic. No erythema.   Psychiatric: He has a normal mood and affect. His behavior is normal. Thought content normal.   Vitals reviewed.      Recent Labs      01/07/18   1550  01/08/18   0550   WBC  6.8  9.3   RBC  5.49  5.19   HEMOGLOBIN  15.6  14.2   HEMATOCRIT  46.7  43.9   MCV  85.1  84.6   MCH  28.4  27.4   MCHC  33.4*  32.3*   RDW  44.1  44.2   PLATELETCT  321  289   MPV  9.5  9.3     Recent Labs      01/07/18   1550  01/07/18   2355  01/08/18   0550   SODIUM  131*  133*  134*   POTASSIUM  4.1  3.9  3.9   CHLORIDE  96  101  103   CO2  18*  17*  14*   GLUCOSE  190*  194*  214*   BUN  17  19  17   CREATININE  1.02  0.94  0.79   CALCIUM  9.0  8.0*  7.8*         Recent Labs      01/07/18   1550   BNPBTYPENAT  9              Assessment/Plan     Acute respiratory failure with hypoxia due to influenza B (CMS-[facility])- (present on admission)   Assessment & Plan    - due to  influenza B infection.  - continue tamiflu to complete 5 days course.   - continue BD per RT protocol, and antitussives.   - continue O2 to keep sats>88%, wean as able.         Influenza B- (present on admission)   Assessment & Plan    - continue tamiflu to complete 5 days course.   - continue supportive care with BD per RT protocol, oxygen. Start PRN mucinex and tessalon for cough, and PRN PO oxycodone for body aches (lots of allergies).   - check procalcitonin, if high may have concomitant bacterial infection and need to consider antibiotics.         Mild DKA (diabetic ketoacidoses) (CMS-HCC)- (present on admission)   Assessment & Plan    - continue aggressive IVF, increase NS to 150cc/hr. Continue home dose lantus, and SSI coverage. Will check BMP q4H, and repeat B-HB in AM to monitor for resolution.        Type 1 diabetes mellitus (CMS-HCC)- (present on admission)   Assessment & Plan    - continue insulin as above. Accuchecks AC and HS. Check HbA1c.        h/o Traumatic amputation of lower limb- bilat- (present on admission)   Assessment & Plan    - uses prosthetics.        HTN (hypertension)- (present on admission)   Assessment & Plan    - does not take home meds. Monitor. Continue PRN IV labetalol.             Reviewed items::  Labs reviewed, Medications reviewed and Radiology images reviewed  Damon catheter::  No Damon  DVT prophylaxis pharmacological::  Enoxaparin (Lovenox)  Ulcer Prophylaxis::  Yes  Antibiotics:  Treating active infection/contamination beyond 24 hours perioperative coverage

## 2018-01-09 LAB
ANION GAP SERPL CALC-SCNC: 9 MMOL/L (ref 0–11.9)
B-OH-BUTYR SERPL-MCNC: 2.25 MMOL/L (ref 0.02–0.27)
BUN SERPL-MCNC: 9 MG/DL (ref 8–22)
CALCIUM SERPL-MCNC: 8.1 MG/DL (ref 8.5–10.5)
CHLORIDE SERPL-SCNC: 105 MMOL/L (ref 96–112)
CO2 SERPL-SCNC: 18 MMOL/L (ref 20–33)
CREAT SERPL-MCNC: 0.78 MG/DL (ref 0.5–1.4)
ERYTHROCYTE [DISTWIDTH] IN BLOOD BY AUTOMATED COUNT: 44.8 FL (ref 35.9–50)
GLUCOSE BLD-MCNC: 126 MG/DL (ref 65–99)
GLUCOSE BLD-MCNC: 61 MG/DL (ref 65–99)
GLUCOSE BLD-MCNC: 63 MG/DL (ref 65–99)
GLUCOSE BLD-MCNC: 84 MG/DL (ref 65–99)
GLUCOSE BLD-MCNC: 96 MG/DL (ref 65–99)
GLUCOSE SERPL-MCNC: 57 MG/DL (ref 65–99)
HCT VFR BLD AUTO: 42 % (ref 42–52)
HGB BLD-MCNC: 14.2 G/DL (ref 14–18)
MCH RBC QN AUTO: 28.7 PG (ref 27–33)
MCHC RBC AUTO-ENTMCNC: 33.8 G/DL (ref 33.7–35.3)
MCV RBC AUTO: 84.8 FL (ref 81.4–97.8)
PLATELET # BLD AUTO: 269 K/UL (ref 164–446)
PMV BLD AUTO: 9.6 FL (ref 9–12.9)
POTASSIUM SERPL-SCNC: 3.7 MMOL/L (ref 3.6–5.5)
PROCALCITONIN SERPL-MCNC: 0.17 NG/ML
RBC # BLD AUTO: 4.95 M/UL (ref 4.7–6.1)
SODIUM SERPL-SCNC: 132 MMOL/L (ref 135–145)
WBC # BLD AUTO: 12.2 K/UL (ref 4.8–10.8)

## 2018-01-09 PROCEDURE — 84145 PROCALCITONIN (PCT): CPT

## 2018-01-09 PROCEDURE — 700102 HCHG RX REV CODE 250 W/ 637 OVERRIDE(OP): Performed by: HOSPITALIST

## 2018-01-09 PROCEDURE — A9270 NON-COVERED ITEM OR SERVICE: HCPCS | Performed by: INTERNAL MEDICINE

## 2018-01-09 PROCEDURE — 700111 HCHG RX REV CODE 636 W/ 250 OVERRIDE (IP): Performed by: HOSPITALIST

## 2018-01-09 PROCEDURE — 700102 HCHG RX REV CODE 250 W/ 637 OVERRIDE(OP): Performed by: INTERNAL MEDICINE

## 2018-01-09 PROCEDURE — 82962 GLUCOSE BLOOD TEST: CPT | Mod: 91

## 2018-01-09 PROCEDURE — A9270 NON-COVERED ITEM OR SERVICE: HCPCS | Performed by: EMERGENCY MEDICINE

## 2018-01-09 PROCEDURE — 770001 HCHG ROOM/CARE - MED/SURG/GYN PRIV*

## 2018-01-09 PROCEDURE — 80048 BASIC METABOLIC PNL TOTAL CA: CPT

## 2018-01-09 PROCEDURE — 99232 SBSQ HOSP IP/OBS MODERATE 35: CPT | Performed by: INTERNAL MEDICINE

## 2018-01-09 PROCEDURE — 700102 HCHG RX REV CODE 250 W/ 637 OVERRIDE(OP): Performed by: EMERGENCY MEDICINE

## 2018-01-09 PROCEDURE — 85027 COMPLETE CBC AUTOMATED: CPT

## 2018-01-09 PROCEDURE — 82010 KETONE BODYS QUAN: CPT

## 2018-01-09 PROCEDURE — A9270 NON-COVERED ITEM OR SERVICE: HCPCS | Performed by: HOSPITALIST

## 2018-01-09 PROCEDURE — 700105 HCHG RX REV CODE 258: Performed by: INTERNAL MEDICINE

## 2018-01-09 RX ORDER — DEXTROSE MONOHYDRATE 25 G/50ML
25 INJECTION, SOLUTION INTRAVENOUS
Status: DISCONTINUED | OUTPATIENT
Start: 2018-01-09 | End: 2018-01-11 | Stop reason: HOSPADM

## 2018-01-09 RX ORDER — INSULIN GLARGINE 100 [IU]/ML
15 INJECTION, SOLUTION SUBCUTANEOUS DAILY
Status: DISCONTINUED | OUTPATIENT
Start: 2018-01-09 | End: 2018-01-10

## 2018-01-09 RX ADMIN — INSULIN GLARGINE 15 UNITS: 100 INJECTION, SOLUTION SUBCUTANEOUS at 09:00

## 2018-01-09 RX ADMIN — SODIUM CHLORIDE: 9 INJECTION, SOLUTION INTRAVENOUS at 21:34

## 2018-01-09 RX ADMIN — GUAIFENESIN 600 MG: 600 TABLET, EXTENDED RELEASE ORAL at 21:30

## 2018-01-09 RX ADMIN — OXYCODONE HYDROCHLORIDE 5 MG: 5 TABLET ORAL at 10:06

## 2018-01-09 RX ADMIN — OXYCODONE HYDROCHLORIDE 5 MG: 5 TABLET ORAL at 05:05

## 2018-01-09 RX ADMIN — OSELTAMIVIR PHOSPHATE 75 MG: 75 CAPSULE ORAL at 22:51

## 2018-01-09 RX ADMIN — OSELTAMIVIR PHOSPHATE 75 MG: 75 CAPSULE ORAL at 10:06

## 2018-01-09 RX ADMIN — SODIUM CHLORIDE: 9 INJECTION, SOLUTION INTRAVENOUS at 12:03

## 2018-01-09 RX ADMIN — FAMOTIDINE 20 MG: 20 TABLET, FILM COATED ORAL at 10:07

## 2018-01-09 RX ADMIN — GUAIFENESIN 600 MG: 600 TABLET, EXTENDED RELEASE ORAL at 10:07

## 2018-01-09 RX ADMIN — ONDANSETRON 4 MG: 2 INJECTION INTRAMUSCULAR; INTRAVENOUS at 10:06

## 2018-01-09 RX ADMIN — STANDARDIZED SENNA CONCENTRATE AND DOCUSATE SODIUM 2 TABLET: 8.6; 5 TABLET, FILM COATED ORAL at 21:33

## 2018-01-09 RX ADMIN — STANDARDIZED SENNA CONCENTRATE AND DOCUSATE SODIUM 2 TABLET: 8.6; 5 TABLET, FILM COATED ORAL at 10:06

## 2018-01-09 RX ADMIN — GUAIFENESIN AND DEXTROMETHORPHAN 10 ML: 100; 10 SYRUP ORAL at 10:06

## 2018-01-09 RX ADMIN — SODIUM CHLORIDE: 9 INJECTION, SOLUTION INTRAVENOUS at 00:11

## 2018-01-09 RX ADMIN — FAMOTIDINE 20 MG: 20 TABLET, FILM COATED ORAL at 21:30

## 2018-01-09 RX ADMIN — ONDANSETRON 4 MG: 2 INJECTION INTRAMUSCULAR; INTRAVENOUS at 21:31

## 2018-01-09 RX ADMIN — ONDANSETRON 4 MG: 2 INJECTION INTRAMUSCULAR; INTRAVENOUS at 16:44

## 2018-01-09 RX ADMIN — SODIUM CHLORIDE: 9 INJECTION, SOLUTION INTRAVENOUS at 05:18

## 2018-01-09 RX ADMIN — ONDANSETRON 4 MG: 2 INJECTION INTRAMUSCULAR; INTRAVENOUS at 03:37

## 2018-01-09 RX ADMIN — AZITHROMYCIN 500 MG: 250 TABLET, FILM COATED ORAL at 12:04

## 2018-01-09 RX ADMIN — OXYCODONE HYDROCHLORIDE 5 MG: 5 TABLET ORAL at 16:44

## 2018-01-09 RX ADMIN — OXYCODONE HYDROCHLORIDE 5 MG: 5 TABLET ORAL at 21:32

## 2018-01-09 RX ADMIN — ENOXAPARIN SODIUM 40 MG: 100 INJECTION SUBCUTANEOUS at 10:07

## 2018-01-09 ASSESSMENT — PAIN SCALES - GENERAL
PAINLEVEL_OUTOF10: 6
PAINLEVEL_OUTOF10: 8
PAINLEVEL_OUTOF10: 3
PAINLEVEL_OUTOF10: 7
PAINLEVEL_OUTOF10: 6
PAINLEVEL_OUTOF10: 8

## 2018-01-09 ASSESSMENT — ENCOUNTER SYMPTOMS
FEVER: 0
VOMITING: 1
SHORTNESS OF BREATH: 1
ABDOMINAL PAIN: 0
WEAKNESS: 1
COUGH: 1
CHILLS: 0
NAUSEA: 1
ROS GI COMMENTS: NO APPETITE
SPUTUM PRODUCTION: 0
MYALGIAS: 1

## 2018-01-09 ASSESSMENT — LIFESTYLE VARIABLES: DO YOU DRINK ALCOHOL: NO

## 2018-01-09 NOTE — PROGRESS NOTES
11:11 AM         []Hide copied text  []Hover for attribution information  Patient continues to have significant pain from coughing and nausea, however, no emesis this shift.  Patient has strong barking cough.  See MAR for PRNs given.  Patient continues to saturate in the mid 90s on 2 LPM 02 NC.

## 2018-01-09 NOTE — CARE PLAN
Problem: Pain Management  Goal: Pain level will decrease to patient's comfort goal    Intervention: Follow pain managment plan developed in collaboration with patient and Interdisciplinary Team  Assessing pain level frequently using 0 to 10 scale.  Providing medication per MAR.  Providing non-pharmacological intervention, therapeutic communication.  Hourly rounding in practice.      Problem: Infection  Goal: Will remain free from infection    Intervention: Give CDC handouts for infection prevention (infection prevention/hand washing, disease specific, and device specific) and document in Education  Hand hygiene and standard precautions educated to pt and implemented. Droplet precautions in place.

## 2018-01-09 NOTE — RESPIRATORY CARE
COPD EDUCATION by COPD CLINICAL EDUCATOR  1/9/2018 at 7:53 AM by Cherelle Woods     Patient reviewed by COPD education team. Patient does not qualify for COPD program.

## 2018-01-09 NOTE — PROGRESS NOTES
"Pt A&Ox4, declines SOB, lightheadedness and CP, complaining of n/v, and 6/10 generlaized pain, RN gave IV Zofran and Po Oxy medication per. Normal S1 and S2 heart sounds, respirations are even and unlabored with coarse crackles/expiratory wheezing breath sounds on 2 L NC, IS at bedside, RN encouraging pt to use 10/hr. Abdomen is soft, nontender with hypoactive bowel sounds. Skin is warm and dry with no breakdown and no edema noted. POC discussed with pt, no further questions at this time, no s/s of distress, call light within reach, will continue to monitor.    BP (!) 162/80   Pulse 95   Temp 36.2 °C (97.1 °F)   Resp 20   Ht 1.727 m (5' 8\")   Wt 60 kg (132 lb 4.4 oz)   SpO2 93%   BMI 20.11 kg/m²     "

## 2018-01-10 LAB
GLUCOSE BLD-MCNC: 181 MG/DL (ref 65–99)
GLUCOSE BLD-MCNC: 184 MG/DL (ref 65–99)
GLUCOSE BLD-MCNC: 219 MG/DL (ref 65–99)
GLUCOSE BLD-MCNC: 78 MG/DL (ref 65–99)
GLUCOSE BLD-MCNC: 92 MG/DL (ref 65–99)

## 2018-01-10 PROCEDURE — 700102 HCHG RX REV CODE 250 W/ 637 OVERRIDE(OP): Performed by: INTERNAL MEDICINE

## 2018-01-10 PROCEDURE — 700102 HCHG RX REV CODE 250 W/ 637 OVERRIDE(OP): Performed by: HOSPITALIST

## 2018-01-10 PROCEDURE — 99232 SBSQ HOSP IP/OBS MODERATE 35: CPT | Performed by: INTERNAL MEDICINE

## 2018-01-10 PROCEDURE — A9270 NON-COVERED ITEM OR SERVICE: HCPCS | Performed by: EMERGENCY MEDICINE

## 2018-01-10 PROCEDURE — A9270 NON-COVERED ITEM OR SERVICE: HCPCS | Performed by: HOSPITALIST

## 2018-01-10 PROCEDURE — 700105 HCHG RX REV CODE 258: Performed by: INTERNAL MEDICINE

## 2018-01-10 PROCEDURE — 700102 HCHG RX REV CODE 250 W/ 637 OVERRIDE(OP): Performed by: EMERGENCY MEDICINE

## 2018-01-10 PROCEDURE — 82962 GLUCOSE BLOOD TEST: CPT | Mod: 91

## 2018-01-10 PROCEDURE — 770001 HCHG ROOM/CARE - MED/SURG/GYN PRIV*

## 2018-01-10 PROCEDURE — 700111 HCHG RX REV CODE 636 W/ 250 OVERRIDE (IP): Performed by: HOSPITALIST

## 2018-01-10 PROCEDURE — A9270 NON-COVERED ITEM OR SERVICE: HCPCS | Performed by: INTERNAL MEDICINE

## 2018-01-10 RX ORDER — INSULIN GLARGINE 100 [IU]/ML
10 INJECTION, SOLUTION SUBCUTANEOUS DAILY
Status: DISCONTINUED | OUTPATIENT
Start: 2018-01-10 | End: 2018-01-11 | Stop reason: HOSPADM

## 2018-01-10 RX ADMIN — SODIUM CHLORIDE: 9 INJECTION, SOLUTION INTRAVENOUS at 08:06

## 2018-01-10 RX ADMIN — STANDARDIZED SENNA CONCENTRATE AND DOCUSATE SODIUM 2 TABLET: 8.6; 5 TABLET, FILM COATED ORAL at 07:55

## 2018-01-10 RX ADMIN — OXYCODONE HYDROCHLORIDE 5 MG: 5 TABLET ORAL at 06:03

## 2018-01-10 RX ADMIN — GUAIFENESIN 600 MG: 600 TABLET, EXTENDED RELEASE ORAL at 21:00

## 2018-01-10 RX ADMIN — AZITHROMYCIN 500 MG: 250 TABLET, FILM COATED ORAL at 09:29

## 2018-01-10 RX ADMIN — OXYCODONE HYDROCHLORIDE 5 MG: 5 TABLET ORAL at 11:01

## 2018-01-10 RX ADMIN — INSULIN GLARGINE 10 UNITS: 100 INJECTION, SOLUTION SUBCUTANEOUS at 11:04

## 2018-01-10 RX ADMIN — INSULIN LISPRO 2 UNITS: 100 INJECTION, SOLUTION INTRAVENOUS; SUBCUTANEOUS at 18:15

## 2018-01-10 RX ADMIN — OSELTAMIVIR PHOSPHATE 75 MG: 75 CAPSULE ORAL at 21:09

## 2018-01-10 RX ADMIN — GUAIFENESIN 600 MG: 600 TABLET, EXTENDED RELEASE ORAL at 07:55

## 2018-01-10 RX ADMIN — OXYCODONE HYDROCHLORIDE 5 MG: 5 TABLET ORAL at 01:55

## 2018-01-10 RX ADMIN — ONDANSETRON 4 MG: 2 INJECTION INTRAMUSCULAR; INTRAVENOUS at 03:04

## 2018-01-10 RX ADMIN — ENOXAPARIN SODIUM 40 MG: 100 INJECTION SUBCUTANEOUS at 07:56

## 2018-01-10 RX ADMIN — OXYCODONE HYDROCHLORIDE 5 MG: 5 TABLET ORAL at 16:14

## 2018-01-10 RX ADMIN — SODIUM CHLORIDE: 9 INJECTION, SOLUTION INTRAVENOUS at 01:55

## 2018-01-10 RX ADMIN — FAMOTIDINE 20 MG: 20 TABLET, FILM COATED ORAL at 21:11

## 2018-01-10 RX ADMIN — INSULIN LISPRO 2 UNITS: 100 INJECTION, SOLUTION INTRAVENOUS; SUBCUTANEOUS at 21:09

## 2018-01-10 RX ADMIN — FAMOTIDINE 20 MG: 20 TABLET, FILM COATED ORAL at 07:56

## 2018-01-10 RX ADMIN — OXYCODONE HYDROCHLORIDE 5 MG: 5 TABLET ORAL at 23:07

## 2018-01-10 RX ADMIN — OSELTAMIVIR PHOSPHATE 75 MG: 75 CAPSULE ORAL at 07:56

## 2018-01-10 ASSESSMENT — ENCOUNTER SYMPTOMS
CHILLS: 0
SPUTUM PRODUCTION: 0
SHORTNESS OF BREATH: 1
WEAKNESS: 1
DIZZINESS: 0
NAUSEA: 0
ABDOMINAL PAIN: 0
COUGH: 1
FEVER: 0
MYALGIAS: 1
VOMITING: 0

## 2018-01-10 ASSESSMENT — PAIN SCALES - GENERAL
PAINLEVEL_OUTOF10: 6
PAINLEVEL_OUTOF10: 3
PAINLEVEL_OUTOF10: 8
PAINLEVEL_OUTOF10: 6
PAINLEVEL_OUTOF10: 8
PAINLEVEL_OUTOF10: 2
PAINLEVEL_OUTOF10: 7
PAINLEVEL_OUTOF10: 3

## 2018-01-10 NOTE — CARE PLAN
Problem: Respiratory:  Goal: Respiratory status will improve  Patient stable on 2 liters.  Patient on continuous pulse ox.  Patient unable to use incentive spirometer due to coughing fits.

## 2018-01-10 NOTE — PROGRESS NOTES
[facility]ist Progress Note    Date of Service: 2018    Chief Complaint  52 y.o. male with T1 DM with gastroparesis, admitted 2018 with SOB, cough, nausea, and chest wall pain. Labs showed anion gap and slightly low bicarbonate. B-HB was 5.56.  Influenza B positive. Had slight desaturations in the ED. Given IVF for mild DKA, which improved. Started on tamiflu.    Interval Problem Update  He had emesis last night, still is not eating very well. Sugar this morning was 57. Follow-up was improved, breath was mildly ketotic. I discussed with him that he needs to eat to avoid going into DKA.     Consultants/Specialty  none    Disposition  Anticipate home with no needs        Review of Systems   Constitutional: Positive for malaise/fatigue. Negative for chills and fever.   Respiratory: Positive for cough and shortness of breath. Negative for sputum production.    Cardiovascular: Negative for chest pain.   Gastrointestinal: Positive for nausea and vomiting. Negative for abdominal pain.        No appetite   Musculoskeletal: Positive for joint pain and myalgias.   Neurological: Positive for weakness.      Physical Exam  Laboratory/Imaging   Hemodynamics  Temp (24hrs), Av.7 °C (98 °F), Min:36.2 °C (97.1 °F), Max:37.2 °C (99 °F)   Temperature: 37.2 °C (99 °F)  Pulse  Av.2  Min: 87  Max: 102    Blood Pressure: 158/111      Respiratory      Respiration: 18, Pulse Oximetry: 93 %        RUL Breath Sounds: Expiratory Wheezes;Rhonchi, RML Breath Sounds: Expiratory Wheezes;Rhonchi, RLL Breath Sounds: Expiratory Wheezes;Rhonchi, GABY Breath Sounds: Expiratory Wheezes;Rhonchi, LLL Breath Sounds: Expiratory Wheezes;Rhonchi    Fluids    Intake/Output Summary (Last 24 hours) at 18 1606  Last data filed at 18 1200   Gross per 24 hour   Intake             2989 ml   Output              700 ml   Net             2289 ml       Nutrition  Orders Placed This Encounter   Procedures   • Diet Order     Standing  Status:   Standing     Number of Occurrences:   1     Order Specific Question:   Diet:     Answer:   Diabetic [3]     Physical Exam   Constitutional: He is oriented to person, place, and time. He appears well-developed and well-nourished. No distress.   Moderately ill-appearing  Not overtly toxic  Ketotic breath   HENT:   Head: Normocephalic and atraumatic.   Mouth/Throat: Oropharynx is clear and moist.   Eyes: EOM are normal. Pupils are equal, round, and reactive to light. Right eye exhibits no discharge. Left eye exhibits no discharge.   Neck: Neck supple.   Cardiovascular: Normal rate and regular rhythm.    Pulmonary/Chest: Effort normal. No respiratory distress. He has wheezes. He has no rales. He exhibits no tenderness.   Diminished with rhonchi left greater than right   Abdominal: Soft. Bowel sounds are normal. He exhibits no distension. There is no tenderness.   Musculoskeletal: He exhibits no edema or tenderness.   Status post BKA left, amputation on right is at level of ankle, no wounds or cellulitis   Neurological: He is alert and oriented to person, place, and time. No cranial nerve deficit.   Skin: Skin is warm and dry. He is not diaphoretic.   Psychiatric: He has a normal mood and affect.   Nursing note and vitals reviewed.      Recent Labs      01/07/18   1550  01/08/18   0550  01/09/18   0509   WBC  6.8  9.3  12.2*   RBC  5.49  5.19  4.95   HEMOGLOBIN  15.6  14.2  14.2   HEMATOCRIT  46.7  43.9  42.0   MCV  85.1  84.6  84.8   MCH  28.4  27.4  28.7   MCHC  33.4*  32.3*  33.8   RDW  44.1  44.2  44.8   PLATELETCT  321  289  269   MPV  9.5  9.3  9.6     Recent Labs      01/07/18   2355  01/08/18   0550  01/09/18   0509   SODIUM  133*  134*  132*   POTASSIUM  3.9  3.9  3.7   CHLORIDE  101  103  105   CO2  17*  14*  18*   GLUCOSE  194*  214*  57*   BUN  19  17  9   CREATININE  0.94  0.79  0.78   CALCIUM  8.0*  7.8*  8.1*         Recent Labs      01/07/18   1550   BNPBTYPENAT  9              Assessment/Plan      Acute respiratory failure with hypoxia due to influenza B (CMS-HCC)- (present on admission)   Assessment & Plan    - due to influenza B infection.  - continue tamiflu to complete 5 days course.   - continue BD per RT protocol, and antitussives.   - continue O2 to keep sats>88%, wean as able.         Influenza B- (present on admission)   Assessment & Plan    Continue Tamiflu  Patient had been ill for several days prior, Tamiflu may be ineffective  No evidence of secondary pneumonia based on Pro calcitonin        Mild DKA (diabetic ketoacidoses) (CMS-HCC)- (present on admission)   Assessment & Plan    Still has mild ketosis and acidosis  Sugars at this time do not support a diagnosis of DKA        Type 1 diabetes mellitus (CMS-HCC)- (present on admission)   Assessment & Plan    Sugar low probably secondary to poor oral intake, Lantus reduced  Breath is ketotic  Discussed improving oral intake to avoid DKA          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        HTN (hypertension)- (present on admission)   Assessment & Plan    - does not take home meds. Monitor. Continue PRN IV labetalol.   Likely responding to stress            Reviewed items::  Labs reviewed and Medications reviewed  Damon catheter::  No Damon  DVT prophylaxis pharmacological::  Enoxaparin (Lovenox)  Ulcer Prophylaxis::  Not indicated

## 2018-01-10 NOTE — CARE PLAN
Problem: Pain Management  Goal: Pain level will decrease to patient's comfort goal  Patient medicated for pain to head chest and back.  Patient able to rest comfortably in between doses.

## 2018-01-10 NOTE — CARE PLAN
Problem: Respiratory:  Goal: Respiratory status will improve    Intervention: Administer and titrate oxygen therapy  Patient weaned to room air, patient oxygen saturation is 94%. Patient refusing IS despite education. Encouraging ambulation as tolerated, patient ambulating in room, no assistance required.       Problem: Communication  Goal: The ability to communicate needs accurately and effectively will improve    Intervention: Educate patient and significant other/support system about the plan of care, procedures, treatments, medications and allow for questions  Discussed lantus has been decreased to 10 units, patient verbalized understanding, patient's blood sugar during lunch time was 78, clarified with Dr. Arevalo to give lantus since patient's blood sugar has been below 70 the past few days, instructed to still administer. FSBS in place before meals and nightly, patient verbalized understanding regarding plan of care.

## 2018-01-10 NOTE — PROGRESS NOTES
[facility] Hospitalist Progress Note    Date of Service: 1/10/2018    Chief Complaint  52 y.o. male with T1 DM with gastroparesis, admitted 2018 with SOB, cough, nausea, and chest wall pain. Labs showed anion gap and slightly low bicarbonate. B-HB was 5.56.  Influenza B positive. Had slight desaturations in the ED. Given IVF for mild DKA, which improved. Started on tamiflu.    Interval Problem Update  Sugars have still been on the low side but his appetite is improved-he looks significantly improved, advised him to try to increase his activity, we'll discontinue fluids, wean off oxygen. Hopefully he'll be okay for home tomorrow.  Discussed w/ RN, updated Case management    Consultants/Specialty  none    Disposition  Anticipate home with no needs        Review of Systems   Constitutional: Positive for malaise/fatigue (improving). Negative for chills and fever.   Respiratory: Positive for cough (less) and shortness of breath (less). Negative for sputum production.    Cardiovascular: Negative for chest pain (only w/ cough).   Gastrointestinal: Negative for abdominal pain, nausea and vomiting.        At all breakfast   Musculoskeletal: Positive for myalgias (improved).   Neurological: Positive for weakness (improving). Negative for dizziness.        Advised to get OOB more      Physical Exam  Laboratory/Imaging   Hemodynamics  Temp (24hrs), Av.6 °C (97.9 °F), Min:36.3 °C (97.3 °F), Max:37.2 °C (98.9 °F)   Temperature: 36.4 °C (97.5 °F)  Pulse  Av.3  Min: 87  Max: 102    Blood Pressure: 121/70      Respiratory      Respiration: 16, Pulse Oximetry: 97 %, O2 Daily Delivery Respiratory : Silicone Nasal Cannula     Work Of Breathing / Effort: Mild  RUL Breath Sounds: Clear, RML Breath Sounds: Clear;Diminished, RLL Breath Sounds: Clear;Diminished, GABY Breath Sounds: Clear, LLL Breath Sounds: Clear;Diminished    Fluids    Intake/Output Summary (Last 24 hours) at 01/10/18 1407  Last data filed at 01/10/18 1218   Gross  per 24 hour   Intake             1440 ml   Output             3550 ml   Net            -2110 ml       Nutrition  Orders Placed This Encounter   Procedures   • Diet Order     Standing Status:   Standing     Number of Occurrences:   1     Order Specific Question:   Diet:     Answer:   Diabetic [3]     Physical Exam   Constitutional: He is oriented to person, place, and time. He appears well-developed and well-nourished. No distress.   HENT:   Head: Normocephalic and atraumatic.   Mouth/Throat: Oropharynx is clear and moist.   Eyes: EOM are normal. Pupils are equal, round, and reactive to light. Right eye exhibits no discharge. Left eye exhibits no discharge.   Neck: Neck supple.   Cardiovascular: Normal rate and regular rhythm.    Pulmonary/Chest: Effort normal. No respiratory distress. He has wheezes (only w/ coughing  R>L). He has no rales. He exhibits no tenderness.   Abdominal: Soft. Bowel sounds are normal. He exhibits no distension. There is no tenderness.   Musculoskeletal: He exhibits no edema or tenderness.   Status post BKA left, amputation on right is at level of ankle, no wounds or cellulitis   Neurological: He is alert and oriented to person, place, and time. No cranial nerve deficit.   Skin: Skin is warm and dry. He is not diaphoretic.   Psychiatric: He has a normal mood and affect.   Nursing note and vitals reviewed.      Recent Labs      01/07/18   1550  01/08/18   0550  01/09/18   0509   WBC  6.8  9.3  12.2*   RBC  5.49  5.19  4.95   HEMOGLOBIN  15.6  14.2  14.2   HEMATOCRIT  46.7  43.9  42.0   MCV  85.1  84.6  84.8   MCH  28.4  27.4  28.7   MCHC  33.4*  32.3*  33.8   RDW  44.1  44.2  44.8   PLATELETCT  321  289  269   MPV  9.5  9.3  9.6     Recent Labs      01/07/18   2355  01/08/18   0550  01/09/18   0509   SODIUM  133*  134*  132*   POTASSIUM  3.9  3.9  3.7   CHLORIDE  101  103  105   CO2  17*  14*  18*   GLUCOSE  194*  214*  57*   BUN  19  17  9   CREATININE  0.94  0.79  0.78   CALCIUM  8.0*  7.8*   8.1*         Recent Labs      01/07/18   1550   BNPBTYPENAT  9              Assessment/Plan     Acute respiratory failure with hypoxia due to influenza B (CMS-HCC)- (present on admission)   Assessment & Plan    On Tamiflu  Improving  Wean O2  ? 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        HTN (hypertension)- (present on admission)   Assessment & Plan    resolved            Reviewed items::  Labs reviewed, Medications reviewed and Radiology images reviewed (CXR clear)  Damon catheter::  No Damon  DVT prophylaxis pharmacological::  Enoxaparin (Lovenox)  Ulcer Prophylaxis::  Not indicated

## 2018-01-10 NOTE — PROGRESS NOTES
Patient is refusing hypoglycemia protocol at this time for a BG of 63.  Patient states he has no s/s of hypoglycemia and he wanted to try to eat dinner before we considered giving D50 and calling the MD.  Dinner tray was delivered at the same time.  Will continue to monitor.

## 2018-01-10 NOTE — PROGRESS NOTES
Pt recheck BG of 84 after supper.  Patient requested whole milk and 4 oz of orange juice to drink at this time.  Will advise oncoming RN of this.

## 2018-01-11 ENCOUNTER — APPOINTMENT (OUTPATIENT)
Dept: RADIOLOGY | Facility: MEDICAL CENTER | Age: 53
DRG: 193 | End: 2018-01-11
Attending: INTERNAL MEDICINE
Payer: MEDICARE

## 2018-01-11 VITALS
TEMPERATURE: 97.8 F | RESPIRATION RATE: 16 BRPM | OXYGEN SATURATION: 97 % | WEIGHT: 132.28 LBS | DIASTOLIC BLOOD PRESSURE: 90 MMHG | HEIGHT: 68 IN | SYSTOLIC BLOOD PRESSURE: 170 MMHG | HEART RATE: 83 BPM | BODY MASS INDEX: 20.05 KG/M2

## 2018-01-11 LAB
GLUCOSE BLD-MCNC: 101 MG/DL (ref 65–99)
GLUCOSE BLD-MCNC: 259 MG/DL (ref 65–99)
GLUCOSE BLD-MCNC: 279 MG/DL (ref 65–99)

## 2018-01-11 PROCEDURE — 700102 HCHG RX REV CODE 250 W/ 637 OVERRIDE(OP): Performed by: INTERNAL MEDICINE

## 2018-01-11 PROCEDURE — A9270 NON-COVERED ITEM OR SERVICE: HCPCS | Performed by: INTERNAL MEDICINE

## 2018-01-11 PROCEDURE — 99239 HOSP IP/OBS DSCHRG MGMT >30: CPT | Performed by: INTERNAL MEDICINE

## 2018-01-11 PROCEDURE — A9270 NON-COVERED ITEM OR SERVICE: HCPCS | Performed by: EMERGENCY MEDICINE

## 2018-01-11 PROCEDURE — 700111 HCHG RX REV CODE 636 W/ 250 OVERRIDE (IP): Performed by: HOSPITALIST

## 2018-01-11 PROCEDURE — 82962 GLUCOSE BLOOD TEST: CPT | Mod: 91

## 2018-01-11 PROCEDURE — 700102 HCHG RX REV CODE 250 W/ 637 OVERRIDE(OP): Performed by: HOSPITALIST

## 2018-01-11 PROCEDURE — 700102 HCHG RX REV CODE 250 W/ 637 OVERRIDE(OP): Performed by: EMERGENCY MEDICINE

## 2018-01-11 PROCEDURE — 71045 X-RAY EXAM CHEST 1 VIEW: CPT

## 2018-01-11 PROCEDURE — A9270 NON-COVERED ITEM OR SERVICE: HCPCS | Performed by: HOSPITALIST

## 2018-01-11 RX ORDER — OSELTAMIVIR PHOSPHATE 75 MG/1
75 CAPSULE ORAL ONCE
Qty: 1 CAP | Refills: 0
Start: 2018-01-12 | End: 2018-01-12

## 2018-01-11 RX ORDER — RANITIDINE 150 MG/1
150 TABLET ORAL 2 TIMES DAILY
Qty: 180 TAB | Refills: 3 | Status: SHIPPED | OUTPATIENT
Start: 2018-01-11 | End: 2018-03-15 | Stop reason: SDUPTHER

## 2018-01-11 RX ADMIN — INSULIN LISPRO 6 UNITS: 100 INJECTION, SOLUTION INTRAVENOUS; SUBCUTANEOUS at 05:54

## 2018-01-11 RX ADMIN — INSULIN GLARGINE 10 UNITS: 100 INJECTION, SOLUTION SUBCUTANEOUS at 09:02

## 2018-01-11 RX ADMIN — OXYCODONE HYDROCHLORIDE 5 MG: 5 TABLET ORAL at 16:28

## 2018-01-11 RX ADMIN — FAMOTIDINE 20 MG: 20 TABLET, FILM COATED ORAL at 08:59

## 2018-01-11 RX ADMIN — INSULIN LISPRO 6 UNITS: 100 INJECTION, SOLUTION INTRAVENOUS; SUBCUTANEOUS at 16:22

## 2018-01-11 RX ADMIN — ENOXAPARIN SODIUM 40 MG: 100 INJECTION SUBCUTANEOUS at 09:00

## 2018-01-11 RX ADMIN — AZITHROMYCIN 500 MG: 250 TABLET, FILM COATED ORAL at 08:59

## 2018-01-11 RX ADMIN — OXYCODONE HYDROCHLORIDE 5 MG: 5 TABLET ORAL at 05:54

## 2018-01-11 RX ADMIN — OXYCODONE HYDROCHLORIDE 5 MG: 5 TABLET ORAL at 12:04

## 2018-01-11 RX ADMIN — OSELTAMIVIR PHOSPHATE 75 MG: 75 CAPSULE ORAL at 08:59

## 2018-01-11 ASSESSMENT — PAIN SCALES - GENERAL
PAINLEVEL_OUTOF10: 2
PAINLEVEL_OUTOF10: 8
PAINLEVEL_OUTOF10: 7
PAINLEVEL_OUTOF10: 3
PAINLEVEL_OUTOF10: 6

## 2018-01-11 NOTE — CARE PLAN
Problem: Pain Management  Goal: Pain level will decrease to patient's comfort goal  Outcome: PROGRESSING AS EXPECTED  Patient's pain has been kept at tolerable levels this shift    Problem: Communication  Goal: The ability to communicate needs accurately and effectively will improve  Outcome: PROGRESSING AS EXPECTED  Patient has appropriately communiated needs and concerns with staff using his call light    Problem: Safety  Goal: Will remain free from injury  Outcome: PROGRESSING AS EXPECTED  Patient has remained free from further injury this shift    Problem: Bowel/Gastric:  Goal: Normal bowel function is maintained or improved  Outcome: PROGRESSING AS EXPECTED  Patient had a BM earlier today

## 2018-01-11 NOTE — DISCHARGE PLANNING
Care Transition Team Assessment    Information Source  Orientation : Oriented x 4  Information Given By: Patient  Who is responsible for making decisions for patient? : Patient    Elopement Risk  Legal Hold: No  Ambulatory or Self Mobile in Wheelchair: Yes  Disoriented: No  Psychiatric Symptoms: None  History of Wandering: No  Elopement this Admit: No  Vocalizing Wanting to Leave: No  Displays Behaviors, Body Language Wanting to Leave: No-Not at Risk for Elopement  Elopement Risk: Not at Risk for Elopement  Purple Armband on Patient: Yes    Interdisciplinary Discharge Planning  Does Admitting Nurse Feel This Could be a Complex Discharge?: No  Primary Care Physician: not yet  Lives with - Patient's Self Care Capacity: Alone and Able to Care For Self  Housing / Facility: [facility]  Do You Take your Prescribed Medications Regularly: Yes  Able to Return to Previous ADL's: Yes  Mobility Issues: Yes  Prior Services: None    Discharge Preparedness  What is your plan after discharge?: Other (comment)  Prior Functional Level: Ambulatory    Functional Assesment  Prior Functional Level: Ambulatory    Finances  Financial Barriers to Discharge: No  Prescription Coverage: Yes    Vision / Hearing Impairment  Vision Impairment : No  Hearing Impairment : No    Values / Beliefs / Concerns  Values / Beliefs Concerns : No    Advance Directive  Advance Directive?: None  Advance Directive offered?: AD Booklet refused    Domestic Abuse  Have you ever been the victim of abuse or violence?: No  Physical Abuse or Sexual Abuse: No  Verbal Abuse or Emotional Abuse: No    Discharge Risks or Barriers  Discharge risks or barriers?: No    Anticipated Discharge Information  Anticipated discharge disposition: Home

## 2018-01-12 LAB
BACTERIA BLD CULT: NORMAL
SIGNIFICANT IND 70042: NORMAL
SITE SITE: NORMAL
SOURCE SOURCE: NORMAL

## 2018-01-12 NOTE — PROGRESS NOTES
Patient discharged to home with car taxi, escorted by MATT Banda by wheelchair, reviewed discharge instructions with patient, patient verbalized understanding, questions answered.

## 2018-01-12 NOTE — DISCHARGE INSTRUCTIONS
"Discharge Instructions    Discharged to home by ambulance with escort. Discharged via wheelchair, hospital escort: Yes.  Special equipment needed: Not Applicable    Be sure to schedule a follow-up appointment with your primary care doctor or any specialists as instructed.     Discharge Plan:   Diet Plan: Discussed  Activity Level: Discussed  Confirmed Follow up Appointment: Patient to Call and Schedule Appointment  Confirmed Symptoms Management: Discussed  Medication Reconciliation Updated: Yes  Influenza Vaccine Indication: Indicated: 9 to 64 years of age  Influenza Vaccine Given - only chart on this line when given: Influenza Vaccine Given (See MAR)    I understand that a diet low in cholesterol, fat, and sodium is recommended for good health. Unless I have been given specific instructions below for another diet, I accept this instruction as my diet prescription.   Other diet: Diabetic diet    Special Instructions: None    · Is patient discharged on Warfarin / Coumadin?   No     · Is patient Post Blood Transfusion?  No    Influenza, Adult  Influenza (\"the flu\") is a viral infection of the respiratory tract. It occurs more often in winter months because people spend more time in close contact with one another. Influenza can make you feel very sick. Influenza easily spreads from person to person (contagious).  CAUSES   Influenza is caused by a virus that infects the respiratory tract. You can catch the virus by breathing in droplets from an infected person's cough or sneeze. You can also catch the virus by touching something that was recently contaminated with the virus and then touching your mouth, nose, or eyes.  RISKS AND COMPLICATIONS  You may be at risk for a more severe case of influenza if you smoke cigarettes, have diabetes, have chronic heart disease (such as heart failure) or lung disease (such as asthma), or if you have a weakened immune system. Elderly people and pregnant women are also at risk for more " serious infections. The most common problem of influenza is a lung infection (pneumonia). Sometimes, this problem can require emergency medical care and may be life threatening.  SIGNS AND SYMPTOMS   Symptoms typically last 4 to 10 days and may include:  · Fever.  · Chills.  · Headache, body aches, and muscle aches.  · Sore throat.  · Chest discomfort and cough.  · Poor appetite.  · Weakness or feeling tired.  · Dizziness.  · Nausea or vomiting.  DIAGNOSIS   Diagnosis of influenza is often made based on your history and a physical exam. A nose or throat swab test can be done to confirm the diagnosis.  TREATMENT   In mild cases, influenza goes away on its own. Treatment is directed at relieving symptoms. For more severe cases, your health care provider may prescribe antiviral medicines to shorten the sickness. Antibiotic medicines are not effective because the infection is caused by a virus, not by bacteria.  HOME CARE INSTRUCTIONS  · Take medicines only as directed by your health care provider.  · Use a cool mist humidifier to make breathing easier.  · Get plenty of rest until your temperature returns to normal. This usually takes 3 to 4 days.  · Drink enough fluid to keep your urine clear or pale yellow.  · Cover your mouth and nose when coughing or sneezing, and wash your hands well to prevent the virus from spreading.  · Stay home from work or school until the fever is gone for at least 1 full day.  PREVENTION   An annual influenza vaccination (flu shot) is the best way to avoid getting influenza. An annual flu shot is now routinely recommended for all adults in the [place]  SEEK MEDICAL CARE IF:  · You experience chest pain, your cough worsens, or you produce more mucus.  · You have nausea, vomiting, or diarrhea.  · Your fever returns or gets worse.  SEEK IMMEDIATE MEDICAL CARE IF:  · You have trouble breathing, you become short of breath, or your skin or nails become bluish.  · You have severe pain or stiffness in  the neck.  · You develop a sudden headache, or pain in the face or ear.  · You have nausea or vomiting that you cannot control.  MAKE SURE YOU:   · Understand these instructions.  · Will watch your condition.  · Will get help right away if you are not doing well or get worse.     This information is not intended to replace advice given to you by your health care provider. Make sure you discuss any questions you have with your health care provider.     Document Released: 12/15/2001 Document Revised: 01/08/2016 Document Reviewed: 03/18/2013  [employer] Interactive Patient Education ©2016 [employer].    Oseltamivir capsules  What is this medicine?  OSELTAMIVIR (os el LANE i vir) is an antiviral medicine. It is used to prevent and to treat some kinds of influenza or the flu. It will not work for colds or other viral infections.  This medicine may be used for other purposes; ask your health care provider or pharmacist if you have questions.  COMMON BRAND NAME(S): Tamiflu  What should I tell my health care provider before I take this medicine?  They need to know if you have any of the following conditions:  -heart disease  -immune system problems  -kidney disease  -liver disease  -lung disease  -an unusual or allergic reaction to oseltamivir, other medicines, foods, dyes, or preservatives  -pregnant or trying to get pregnant  -breast-feeding  How should I use this medicine?  Take this medicine by mouth with a glass of water. Follow the directions on the prescription label. Start this medicine at the first sign of flu symptoms. You can take it with or without food. If it upsets your stomach, take it with food. Take your medicine at regular intervals. Do not take your medicine more often than directed. Take all of your medicine as directed even if you think you are better. Do not skip doses or stop your medicine early.  Talk to your pediatrician regarding the use of this medicine in children. While this drug may be  prescribed for children as young as 14 days for selected conditions, precautions do apply.  Overdosage: If you think you have taken too much of this medicine contact a poison control center or emergency room at once.  NOTE: This medicine is only for you. Do not share this medicine with others.  What if I miss a dose?  If you miss a dose, take it as soon as you remember. If it is almost time for your next dose (within 2 hours), take only that dose. Do not take double or extra doses.  What may interact with this medicine?  Interactions are not expected.  This list may not describe all possible interactions. Give your health care provider a list of all the medicines, herbs, non-prescription drugs, or dietary supplements you use. Also tell them if you smoke, drink alcohol, or use illegal drugs. Some items may interact with your medicine.  What should I watch for while using this medicine?  Visit your doctor or health care professional for regular check ups. Tell your doctor if your symptoms do not start to get better or if they get worse.  If you have the flu, you may be at an increased risk of developing seizures, confusion, or abnormal behavior. This occurs early in the illness, and more frequently in children and teens. These events are not common, but may result in accidental injury to the patient. Families and caregivers of patients should watch for signs of unusual behavior and contact a doctor or health care professional right away if the patient shows signs of unusual behavior.  This medicine is not a substitute for the flu shot. Talk to your doctor each year about an annual flu shot.  What side effects may I notice from receiving this medicine?  Side effects that you should report to your doctor or health care professional as soon as possible:  -allergic reactions like skin rash, itching or hives, swelling of the face, lips, or tongue  -anxiety, confusion, unusual behavior  -breathing problems  -hallucination,  loss of contact with reality  -redness, blistering, peeling or loosening of the skin, including inside the mouth  -seizures  Side effects that usually do not require medical attention (report to your doctor or health care professional if they continue or are bothersome):  -cough  -diarrhea  -dizziness  -headache  -nausea, vomiting  -stomach pain  This list may not describe all possible side effects. Call your doctor for medical advice about side effects. You may report side effects to FDA at 1-391-FZJ-5243.  Where should I keep my medicine?  Keep out of the reach of children.  Store at room temperature between 15 and 30 degrees C (59 and 86 degrees F). Throw away any unused medicine after the expiration date.  NOTE: This sheet is a summary. It may not cover all possible information. If you have questions about this medicine, talk to your doctor, pharmacist, or health care provider.  © 2014, [employer]. (12/21/2012 7:43:38 PM)      Diabetic Ketoacidosis  Diabetic ketoacidosis is a life-threatening complication of diabetes. If it is not treated, it can cause severe dehydration and organ damage and can lead to a coma or death.  CAUSES  This condition develops when there is not enough of the hormone insulin in the body. Insulin helps the body to break down sugar for energy. Without insulin, the body cannot break down sugar, so it breaks down fats instead. This leads to the production of acids that are called ketones. Ketones are poisonous at high levels.  This condition can be triggered by:  · Stress on the body that is brought on by an illness.  · Medicines that raise blood glucose levels.  · Not taking diabetes medicine.  SYMPTOMS  Symptoms of this condition include:  · Fatigue.  · Weight loss.  · Excessive thirst.  · Light-headedness.  · Fruity or sweet-smelling breath.  · Excessive urination.  · Vision changes.  · Confusion or irritability.  · Nausea.  · Vomiting.  · Rapid breathing.  · Abdominal  pain.  · Feeling flushed.  DIAGNOSIS  This condition is diagnosed based on a medical history, a physical exam, and blood tests. You may also have a urine test that checks for ketones.  TREATMENT  This condition may be treated with:  · Fluid replacement. This may be done to correct dehydration.  · Insulin injections. These may be given through the skin or through an IV tube.  · Electrolyte replacement. Electrolytes, such as potassium and sodium, may be given in pill form or through an IV tube.  · Antibiotic medicines. These may be prescribed if your condition was caused by an infection.  HOME CARE INSTRUCTIONS  Eating and Drinking  · Drink enough fluids to keep your urine clear or pale yellow.  · If you cannot eat, alternate between drinking fluids with sugar (such as juice) and salty fluids (such as broth or bouillon).  · If you can eat, follow your usual diet and drink sugar-free liquids, such as water.  Other Instructions  · Take insulin as directed by your health care provider. Do not skip insulin injections. Do not use  insulin.  · If your blood sugar is over 240 mg/dL, monitor your urine ketones every 4-6 hours.  · If you were prescribed an antibiotic medicine, finish all of it even if you start to feel better.  · Rest and exercise only as directed by your health care provider.  · If you get sick, call your health care provider and begin treatment quickly. Your body often needs extra insulin to fight an illness.  · Check your blood glucose levels regularly. If your blood glucose is high, drink plenty of fluids. This helps to flush out ketones.  SEEK MEDICAL CARE IF:  · Your blood glucose level is too high or too low.  · You have ketones in your urine.  · You have a fever.  · You cannot eat.  · You cannot tolerate fluids.  · You have been vomiting for more than 2 hours.  · You continue to have symptoms of this condition.  · You develop new symptoms.  SEEK IMMEDIATE MEDICAL CARE IF:  · Your blood glucose  "levels continue to be high (elevated).  · Your monitor reads \"high\" even when you are taking insulin.  · You faint.  · You have chest pain.  · You have trouble breathing.  · You have a sudden, severe headache.  · You have sudden weakness in one arm or one leg.  · You have sudden trouble speaking or swallowing.  · You have vomiting or diarrhea that gets worse after 3 hours.  · You feel severely fatigued.  · You have trouble thinking.  · You have abdominal pain.  · You are severely dehydrated. Symptoms of severe dehydration include:  ¨ Extreme thirst.  ¨ Dry mouth.  ¨ Blue lips.  ¨ Cold hands and feet.  ¨ Rapid breathing.     This information is not intended to replace advice given to you by your health care provider. Make sure you discuss any questions you have with your health care provider.     Document Released: 12/15/2001 Document Revised: 05/03/2016 Document Reviewed: 11/25/2015  [employer] Interactive Patient Education ©2016 [employer].      Depression / Suicide Risk    As you are discharged from this [facility] facility, it is important to learn how to keep safe from harming yourself.    Recognize the warning signs:  · Abrupt changes in personality, positive or negative- including increase in energy   · Giving away possessions  · Change in eating patterns- significant weight changes-  positive or negative  · Change in sleeping patterns- unable to sleep or sleeping all the time   · Unwillingness or inability to communicate  · Depression  · Unusual sadness, discouragement and loneliness  · Talk of wanting to die  · Neglect of personal appearance   · Rebelliousness- reckless behavior  · Withdrawal from people/activities they love  · Confusion- inability to concentrate     If you or a loved one observes any of these behaviors or has concerns about self-harm, here's what you can do:  · Talk about it- your feelings and reasons for harming yourself  · Remove any means that you might use to hurt yourself (examples: " pills, rope, extension cords, firearm)  · Get professional help from the community (Mental Health, Substance Abuse, psychological counseling)  · Do not be alone:Call your Safe Contact- someone whom you trust who will be there for you.  · Call your local CRISIS HOTLINE 727-6748 or 489-966-6169  · Call your local Children's Mobile Crisis Response Team [place] (253) 126-7033 or www.Enigmatec  · Call the toll free National Suicide Prevention Hotlines   · National Suicide Prevention Lifeline 428-172-TAQA (6983)  · National Hope Line Network 800-SUICIDE (050-9569)

## 2018-01-12 NOTE — DISCHARGE SUMMARY
"CHIEF COMPLAINT ON ADMISSION  Chief Complaint   Patient presents with   • Shortness of Breath     x3d   • Cough     x3d, barky   • Chest Wall Pain     \"It hurts when I breathe, I cannot take a deep breath\"   • Medication Refill     New to [facility], requesting rx for insulin and needle tips for insulin pen       CODE STATUS  Prior    HPI & HOSPITAL COURSE  52 y.o. male with T1 DM with gastroparesis, admitted 1/7/2018 with SOB, cough, nausea, and chest wall pain. Labs showed anion gap and slightly low bicarbonate. B-HB was 5.56.  Influenza B positive. Had slight desaturations in the ED. Given IVF for mild DKA-related to starvation, which improved without insulin drip.   Chest x-ray was clear, pro-calcitonin was negative. He was treated with Tamiflu. He had a persistent barking nonproductive cough, this improved during his stay. Initially his appetite and intake was quite poor, basal insulin was titrated down accordingly. His oral intake improved, his hypoglycemia improved. Ketoacidosis resolved clinically. His activity improved and he felt symptomatically better. Patient related that he has no primary care physician to refill his insulin and needles etc.  Primary care follow-up was arranged, but adequate refills of his medications were also given at discharge as he is a type I diabetic.    Discharge medications discussed in detail with the patient and his nurse at discharge.    Therefore, he is discharged in good and stable condition with close outpatient follow-up.    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 *      FOLLOW UP  Future Appointments  Date Time Provider Department Center   1/15/2018 10:20 AM CARE MANAGER GONZALES MAXIMO   1/17/2018 1:00 PM STEPHEN Sanchez   2/8/2018 9:20 AM [facility]     Zhao Mckenzie M.D.  [address] [facility] [address]  [place] [address]  [phone]    In 1 week      Zhao Mckenzie M.D.  [address] [facility]  [address]  [place] [address]  [phone]            MEDICATIONS ON DISCHARGE   Juvenal Boone   Home Medication Instructions ETHAN:[id number]    Printed on:01/12/18 0900   Medication Information                      Insulin Glargine (TOUJEO SOLOSTAR) 300 UNIT/ML Solution Pen-injector  Inject 20 Units as instructed every morning.             insulin lispro, Human, (HUMALOG) 100 UNIT/ML Solution Pen-injector injection  151-200 = 4 units; 201-250 = 6 units;  = 8 units; 301-350 = 10 units; 351-400 = 12 units             oseltamivir (TAMIFLU) 75 MG Cap  Take 1 Cap by mouth Once for 1 dose.             ranitidine (ZANTAC) 150 MG Tab  Take 1 Tab by mouth 2 times a day.                 DIET  Consistent carbohydrate restricted    ACTIVITY  As tolerated      CONSULTATIONS  None    PROCEDURES  None    LABORATORY  Lab Results   Component Value Date/Time    SODIUM 132 (L) 01/09/2018 05:09 AM    POTASSIUM 3.7 01/09/2018 05:09 AM    CHLORIDE 105 01/09/2018 05:09 AM    CO2 18 (L) 01/09/2018 05:09 AM    GLUCOSE 57 (L) 01/09/2018 05:09 AM    BUN 9 01/09/2018 05:09 AM    CREATININE 0.78 01/09/2018 05:09 AM        Lab Results   Component Value Date/Time    WBC 12.2 (H) 01/09/2018 05:09 AM    HEMOGLOBIN 14.2 01/09/2018 05:09 AM    HEMATOCRIT 42.0 01/09/2018 05:09 AM    PLATELETCT 269 01/09/2018 05:09 AM        Total time of the discharge process exceeds 40 minutes

## 2018-01-13 LAB
BACTERIA BLD CULT: NORMAL
SIGNIFICANT IND 70042: NORMAL
SITE SITE: NORMAL
SOURCE SOURCE: NORMAL

## 2018-01-15 ENCOUNTER — PATIENT OUTREACH (OUTPATIENT)
Dept: HEALTH INFORMATION MANAGEMENT | Facility: OTHER | Age: 53
End: 2018-01-15

## 2018-01-15 NOTE — PROGRESS NOTES
1/15/18 10:15am  CM post discharge outreach first attempt. Individual that answered telephone states patient is no longer living at this address.  No additional telephone number listed in Epic for CM to contact patient for post discharge outreach call.  CM unable to complete post discharge call.

## 2018-02-24 ENCOUNTER — OFFICE VISIT (OUTPATIENT)
Dept: URGENT CARE | Facility: CLINIC | Age: 53
End: 2018-02-24
Payer: MEDICARE

## 2018-02-24 VITALS
OXYGEN SATURATION: 95 % | BODY MASS INDEX: 22.5 KG/M2 | TEMPERATURE: 99.7 F | DIASTOLIC BLOOD PRESSURE: 62 MMHG | HEART RATE: 104 BPM | WEIGHT: 140 LBS | HEIGHT: 66 IN | SYSTOLIC BLOOD PRESSURE: 132 MMHG

## 2018-02-24 DIAGNOSIS — B96.89 ACUTE BACTERIAL BRONCHITIS: ICD-10-CM

## 2018-02-24 DIAGNOSIS — J20.8 ACUTE BACTERIAL BRONCHITIS: ICD-10-CM

## 2018-02-24 DIAGNOSIS — H66.003 ACUTE SUPPURATIVE OTITIS MEDIA OF BOTH EARS WITHOUT SPONTANEOUS RUPTURE OF TYMPANIC MEMBRANES, RECURRENCE NOT SPECIFIED: ICD-10-CM

## 2018-02-24 DIAGNOSIS — J98.8 RTI (RESPIRATORY TRACT INFECTION): ICD-10-CM

## 2018-02-24 PROCEDURE — 99214 OFFICE O/P EST MOD 30 MIN: CPT | Performed by: FAMILY MEDICINE

## 2018-02-24 RX ORDER — PHENYLEPHRINE HCL 10 MG/1
10 TABLET, FILM COATED ORAL EVERY 6 HOURS PRN
Qty: 20 TAB | Refills: 0 | Status: SHIPPED | OUTPATIENT
Start: 2018-02-24 | End: 2018-03-15

## 2018-02-24 RX ORDER — BENZONATATE 100 MG/1
200 CAPSULE ORAL 3 TIMES DAILY PRN
Qty: 30 CAP | Refills: 1 | Status: SHIPPED | OUTPATIENT
Start: 2018-02-24 | End: 2018-03-15

## 2018-02-24 RX ORDER — CEFDINIR 300 MG/1
300 CAPSULE ORAL EVERY 12 HOURS
Qty: 14 CAP | Refills: 0 | Status: SHIPPED | OUTPATIENT
Start: 2018-02-24 | End: 2018-03-03

## 2018-02-24 RX ORDER — ALBUTEROL SULFATE 90 UG/1
2 AEROSOL, METERED RESPIRATORY (INHALATION) EVERY 6 HOURS PRN
Qty: 8.5 G | Refills: 3 | Status: SHIPPED | OUTPATIENT
Start: 2018-02-24 | End: 2018-03-15

## 2018-02-24 ASSESSMENT — ENCOUNTER SYMPTOMS
FEVER: 0
DIZZINESS: 0
FOCAL WEAKNESS: 0
ORTHOPNEA: 0
SHORTNESS OF BREATH: 0
CHILLS: 0
HEMOPTYSIS: 0

## 2018-02-25 NOTE — PROGRESS NOTES
Subjective:      Juvenal Boone is a 52 y.o. male who presents with Cough (x 1 month, ear ache )    Chief Complaint   Patient presents with   • Cough     x 1 month, ear ache         - This is a very pleasant 52 y.o. male with complaints of cough x 3-4wks, dry, no NVFC and ears hurt     - Hx DMI, working w/ pcp to get sugars under better control           ALLERGIES:  Ativan; Benadryl [altaryl]; Demerol; Fentanyl; Ibuprofen; Morphine; Other misc; Phenergan [promethazine hcl]; Reglan [metoclopramide hcl]; Toradol; and Tylenol     PMH:  Past Medical History:   Diagnosis Date   • Crush injury of leg     right   • Diabetes    • Gastroparesis    • Osteomyelitis (CMS-HCC)     leg   • Traumatic amputation of leg(s) (complete) (partial), unilateral, below knee, without mention of complication     left below knee and right foot        MEDS:    Current Outpatient Prescriptions:   •  albuterol 108 (90 Base) MCG/ACT Aero Soln inhalation aerosol, Inhale 2 Puffs by mouth every 6 hours as needed for Shortness of Breath., Disp: 8.5 g, Rfl: 3  •  benzonatate (TESSALON) 100 MG Cap, Take 2 Caps by mouth 3 times a day as needed for Cough., Disp: 30 Cap, Rfl: 1  •  cefdinir (OMNICEF) 300 MG Cap, Take 1 Cap by mouth every 12 hours for 7 days., Disp: 14 Cap, Rfl: 0  •  phenylephrine (SUDAFED PE) 10 MG Tab, Take 1 Tab by mouth every 6 hours as needed for Congestion., Disp: 20 Tab, Rfl: 0  •  Insulin Glargine (TOUJEO SOLOSTAR) 300 UNIT/ML Solution Pen-injector, Inject 20 Units as instructed every morning., Disp: 1 PEN, Rfl: 99  •  ranitidine (ZANTAC) 150 MG Tab, Take 1 Tab by mouth 2 times a day., Disp: 180 Tab, Rfl: 3  •  insulin lispro, Human, (HUMALOG) 100 UNIT/ML Solution Pen-injector injection, 151-200 = 4 units; 201-250 = 6 units;  = 8 units; 301-350 = 10 units; 351-400 = 12 units, Disp: 3 PEN, Rfl: 99    ** I have documented what I find to be significant in regards to past medical, social, family and surgical history  in my HPI  "or under PMH/PSH/FH review section, otherwise it is contributory **           HPI    Review of Systems   Constitutional: Negative for chills and fever.   Respiratory: Negative for hemoptysis and shortness of breath.    Cardiovascular: Negative for chest pain and orthopnea.   Neurological: Negative for dizziness and focal weakness.          Objective:     /62   Pulse (!) 104   Temp 37.6 °C (99.7 °F)   Ht 1.676 m (5' 6\")   Wt 63.5 kg (140 lb)   SpO2 95%   BMI 22.60 kg/m²      Physical Exam   Constitutional: He appears well-developed. No distress.   HENT:   Head: Normocephalic and atraumatic.   Mouth/Throat: Oropharynx is clear and moist.   Eyes: Conjunctivae are normal.   Neck: Neck supple.   Cardiovascular: Regular rhythm.    No murmur heard.  Pulmonary/Chest: Effort normal and breath sounds normal. No respiratory distress.   Neurological: He is alert. He exhibits normal muscle tone.   Skin: Skin is warm and dry.   Psychiatric: He has a normal mood and affect. Judgment normal.   Nursing note and vitals reviewed.  ears: TM's red dull full            Assessment/Plan:         1. RTI (respiratory tract infection)     2. Acute bacterial bronchitis  albuterol 108 (90 Base) MCG/ACT Aero Soln inhalation aerosol    benzonatate (TESSALON) 100 MG Cap    cefdinir (OMNICEF) 300 MG Cap   3. Acute suppurative otitis media of both ears without spontaneous rupture of tympanic membranes, recurrence not specified  cefdinir (OMNICEF) 300 MG Cap    phenylephrine (SUDAFED PE) 10 MG Tab             Dx & d/c instructions discussed w/ patient and/or family members. Follow up w/ Prvt Dr or here in 3-4 days if not getting better, sooner if needed,  ER if worse and UC/PCP unavailable.        Possible side effects (i.e. Rash, GI upset/constipation, sedation, elevation of BP or sugars) of any medications given discussed.                "

## 2018-03-06 ENCOUNTER — APPOINTMENT (OUTPATIENT)
Dept: INTERNAL MEDICINE | Facility: MEDICAL CENTER | Age: 53
End: 2018-03-06
Payer: MEDICARE

## 2018-03-15 ENCOUNTER — OFFICE VISIT (OUTPATIENT)
Dept: INTERNAL MEDICINE | Facility: MEDICAL CENTER | Age: 53
End: 2018-03-15
Payer: MEDICARE

## 2018-03-15 VITALS
HEIGHT: 65 IN | SYSTOLIC BLOOD PRESSURE: 118 MMHG | OXYGEN SATURATION: 98 % | TEMPERATURE: 97.3 F | HEART RATE: 80 BPM | WEIGHT: 139.8 LBS | DIASTOLIC BLOOD PRESSURE: 70 MMHG | BODY MASS INDEX: 23.29 KG/M2

## 2018-03-15 DIAGNOSIS — Z00.00 ROUTINE HEALTH MAINTENANCE: Primary | ICD-10-CM

## 2018-03-15 DIAGNOSIS — Z12.11 COLON CANCER SCREENING: ICD-10-CM

## 2018-03-15 DIAGNOSIS — E10.610 TYPE 1 DIABETES MELLITUS WITH DIABETIC NEUROPATHIC ARTHROPATHY (HCC): ICD-10-CM

## 2018-03-15 DIAGNOSIS — K21.9 GASTROESOPHAGEAL REFLUX DISEASE WITHOUT ESOPHAGITIS: ICD-10-CM

## 2018-03-15 DIAGNOSIS — H91.8X2 OTHER SPECIFIED HEARING LOSS OF LEFT EAR, UNSPECIFIED HEARING STATUS ON CONTRALATERAL SIDE: ICD-10-CM

## 2018-03-15 DIAGNOSIS — G54.7 PHANTOM LIMB SYNDROME (HCC): ICD-10-CM

## 2018-03-15 PROBLEM — Z87.01 HISTORY OF PNEUMONIA: Status: ACTIVE | Noted: 2018-03-15

## 2018-03-15 PROCEDURE — 99204 OFFICE O/P NEW MOD 45 MIN: CPT | Mod: GC | Performed by: INTERNAL MEDICINE

## 2018-03-15 RX ORDER — PEN NEEDLE, DIABETIC 32GX 5/32"
NEEDLE, DISPOSABLE MISCELLANEOUS
Qty: 400 EACH | Refills: 3 | Status: SHIPPED | OUTPATIENT
Start: 2018-03-15 | End: 2018-03-16 | Stop reason: SDUPTHER

## 2018-03-15 RX ORDER — RANITIDINE 150 MG/1
150 TABLET ORAL 2 TIMES DAILY
Qty: 180 TAB | Refills: 3 | Status: SHIPPED | OUTPATIENT
Start: 2018-03-15 | End: 2018-06-11 | Stop reason: SDUPTHER

## 2018-03-15 RX ORDER — PEN NEEDLE, DIABETIC 32GX 5/32"
NEEDLE, DISPOSABLE MISCELLANEOUS
Refills: 3 | COMMUNITY
Start: 2018-01-24 | End: 2018-03-15 | Stop reason: SDUPTHER

## 2018-03-15 RX ORDER — BLOOD-GLUCOSE METER
1 KIT MISCELLANEOUS
Qty: 1 KIT | Refills: 3 | Status: SHIPPED | OUTPATIENT
Start: 2018-03-15 | End: 2018-03-15 | Stop reason: SDUPTHER

## 2018-03-15 RX ORDER — BLOOD-GLUCOSE METER
1 KIT MISCELLANEOUS
COMMUNITY
End: 2018-03-15 | Stop reason: SDUPTHER

## 2018-03-15 RX ORDER — BLOOD-GLUCOSE METER
1 KIT MISCELLANEOUS
Qty: 1 KIT | Refills: 3 | Status: SHIPPED | OUTPATIENT
Start: 2018-03-15 | End: 2018-03-16 | Stop reason: SDUPTHER

## 2018-03-15 RX ORDER — PEN NEEDLE, DIABETIC 32GX 5/32"
NEEDLE, DISPOSABLE MISCELLANEOUS
Qty: 400 EACH | Refills: 3 | Status: SHIPPED | OUTPATIENT
Start: 2018-03-15 | End: 2018-03-15 | Stop reason: SDUPTHER

## 2018-03-15 ASSESSMENT — PATIENT HEALTH QUESTIONNAIRE - PHQ9: CLINICAL INTERPRETATION OF PHQ2 SCORE: 0
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
## 2018-03-16 RX ORDER — PEN NEEDLE, DIABETIC 32GX 5/32"
NEEDLE, DISPOSABLE MISCELLANEOUS
Qty: 400 EACH | Refills: 3 | Status: SHIPPED
Start: 2018-03-16 | End: 2018-06-11 | Stop reason: SDUPTHER

## 2018-03-16 RX ORDER — BLOOD-GLUCOSE METER
1 KIT MISCELLANEOUS
Qty: 1 KIT | Refills: 3 | Status: SHIPPED
Start: 2018-03-16 | End: 2018-12-26 | Stop reason: SDUPTHER

## 2018-03-19 ENCOUNTER — TELEPHONE (OUTPATIENT)
Dept: INTERNAL MEDICINE | Facility: MEDICAL CENTER | Age: 53
End: 2018-03-19

## 2018-03-19 DIAGNOSIS — Z12.9 SCREENING OF CANCER: ICD-10-CM

## 2018-03-19 NOTE — TELEPHONE ENCOUNTER
1. Caller Name: Martha with imaging scheduling                       Call Back Number: [phone]  #[phone]    2. Message: Even though it's an Epic option, [facility] does not perform virtual colonoscopies.     3. Patient approves office to leave a detailed voicemail/MyChart message: yes

## 2018-05-23 ENCOUNTER — OFFICE VISIT (OUTPATIENT)
Dept: URGENT CARE | Facility: CLINIC | Age: 53
End: 2018-05-23
Payer: MEDICARE

## 2018-05-23 VITALS
OXYGEN SATURATION: 98 % | DIASTOLIC BLOOD PRESSURE: 64 MMHG | WEIGHT: 139 LBS | BODY MASS INDEX: 23.16 KG/M2 | TEMPERATURE: 98.1 F | HEIGHT: 65 IN | SYSTOLIC BLOOD PRESSURE: 118 MMHG | HEART RATE: 100 BPM | RESPIRATION RATE: 16 BRPM

## 2018-05-23 DIAGNOSIS — E10.610 TYPE 1 DIABETES MELLITUS WITH DIABETIC NEUROPATHIC ARTHROPATHY (HCC): ICD-10-CM

## 2018-05-23 DIAGNOSIS — M54.9 DORSALGIA: ICD-10-CM

## 2018-05-23 PROCEDURE — 99214 OFFICE O/P EST MOD 30 MIN: CPT | Performed by: FAMILY MEDICINE

## 2018-05-23 RX ORDER — CYCLOBENZAPRINE HCL 5 MG
5-10 TABLET ORAL 3 TIMES DAILY PRN
Qty: 30 TAB | Refills: 0 | Status: SHIPPED | OUTPATIENT
Start: 2018-05-23 | End: 2018-12-11

## 2018-05-23 ASSESSMENT — ENCOUNTER SYMPTOMS
ORTHOPNEA: 0
HEMOPTYSIS: 0
FOCAL WEAKNESS: 0
CHILLS: 0
DIZZINESS: 0
FEVER: 0
SHORTNESS OF BREATH: 0
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
## 2018-06-11 DIAGNOSIS — K21.9 GASTROESOPHAGEAL REFLUX DISEASE WITHOUT ESOPHAGITIS: ICD-10-CM

## 2018-06-11 DIAGNOSIS — E10.610 TYPE 1 DIABETES MELLITUS WITH DIABETIC NEUROPATHIC ARTHROPATHY (HCC): ICD-10-CM

## 2018-06-11 NOTE — TELEPHONE ENCOUNTER
Was the patient seen in the last year in this department? Yes  Pt last seen on: 03/15/18 by Dr. Sterling Next appt on: none      Does patient have an active prescription for medications requested? No     Received Request Via: Patient

## 2018-06-12 RX ORDER — RANITIDINE 150 MG/1
150 TABLET ORAL 2 TIMES DAILY
Qty: 180 TAB | Refills: 0 | Status: SHIPPED | OUTPATIENT
Start: 2018-06-12 | End: 2018-12-11 | Stop reason: SDUPTHER

## 2018-06-12 RX ORDER — PEN NEEDLE, DIABETIC 32GX 5/32"
NEEDLE, DISPOSABLE MISCELLANEOUS
Qty: 400 EACH | Refills: 0 | Status: SHIPPED | OUTPATIENT
Start: 2018-06-12 | End: 2018-12-11 | Stop reason: SDUPTHER

## 2018-08-26 DIAGNOSIS — E10.610 TYPE 1 DIABETES MELLITUS WITH DIABETIC NEUROPATHIC ARTHROPATHY (HCC): ICD-10-CM

## 2018-08-27 RX ORDER — INSULIN LISPRO 100 [IU]/ML
INJECTION, SOLUTION INTRAVENOUS; SUBCUTANEOUS
Qty: 9 ML | Refills: 0 | Status: SHIPPED | OUTPATIENT
Start: 2018-08-27 | End: 2018-09-23 | Stop reason: SDUPTHER

## 2018-08-27 RX ORDER — INSULIN GLARGINE 300 U/ML
25 INJECTION, SOLUTION SUBCUTANEOUS EVERY MORNING
Qty: 6 PEN | Refills: 0 | Status: SHIPPED | OUTPATIENT
Start: 2018-08-27 | End: 2018-12-11 | Stop reason: SDUPTHER

## 2018-08-27 NOTE — TELEPHONE ENCOUNTER
Last seen: 03/15/18 by Dr. Sterling  Next appt: None     Was the patient seen in the last year in this department? Yes   Does patient have an active prescription for medications requested? No   Received Request Via: Pharmacy

## 2018-12-09 ENCOUNTER — OFFICE VISIT (OUTPATIENT)
Dept: URGENT CARE | Facility: CLINIC | Age: 53
End: 2018-12-09
Payer: MEDICARE

## 2018-12-09 ENCOUNTER — APPOINTMENT (OUTPATIENT)
Dept: RADIOLOGY | Facility: IMAGING CENTER | Age: 53
End: 2018-12-09
Attending: NURSE PRACTITIONER
Payer: MEDICARE

## 2018-12-09 VITALS
HEART RATE: 97 BPM | TEMPERATURE: 99 F | BODY MASS INDEX: 23.16 KG/M2 | HEIGHT: 65 IN | WEIGHT: 139 LBS | SYSTOLIC BLOOD PRESSURE: 100 MMHG | OXYGEN SATURATION: 94 % | RESPIRATION RATE: 16 BRPM | DIASTOLIC BLOOD PRESSURE: 66 MMHG

## 2018-12-09 DIAGNOSIS — R06.02 SOB (SHORTNESS OF BREATH): ICD-10-CM

## 2018-12-09 DIAGNOSIS — R05.9 COUGH: ICD-10-CM

## 2018-12-09 DIAGNOSIS — J40 BRONCHITIS: ICD-10-CM

## 2018-12-09 PROCEDURE — 71046 X-RAY EXAM CHEST 2 VIEWS: CPT | Mod: 26 | Performed by: NURSE PRACTITIONER

## 2018-12-09 PROCEDURE — 99214 OFFICE O/P EST MOD 30 MIN: CPT | Mod: 25 | Performed by: NURSE PRACTITIONER

## 2018-12-09 PROCEDURE — 94640 AIRWAY INHALATION TREATMENT: CPT | Performed by: NURSE PRACTITIONER

## 2018-12-09 RX ORDER — AZITHROMYCIN 250 MG/1
TABLET, FILM COATED ORAL
Qty: 6 TAB | Refills: 0 | Status: SHIPPED | OUTPATIENT
Start: 2018-12-09 | End: 2019-02-27

## 2018-12-09 RX ORDER — ALBUTEROL SULFATE 2.5 MG/3ML
2.5 SOLUTION RESPIRATORY (INHALATION) ONCE
Status: COMPLETED | OUTPATIENT
Start: 2018-12-09 | End: 2018-12-09

## 2018-12-09 RX ORDER — METHYLPREDNISOLONE SODIUM SUCCINATE 125 MG/2ML
125 INJECTION, POWDER, LYOPHILIZED, FOR SOLUTION INTRAMUSCULAR; INTRAVENOUS ONCE
Status: COMPLETED | OUTPATIENT
Start: 2018-12-09 | End: 2018-12-09

## 2018-12-09 RX ADMIN — ALBUTEROL SULFATE 2.5 MG: 2.5 SOLUTION RESPIRATORY (INHALATION) at 15:26

## 2018-12-09 RX ADMIN — METHYLPREDNISOLONE SODIUM SUCCINATE 125 MG: 125 INJECTION, POWDER, LYOPHILIZED, FOR SOLUTION INTRAMUSCULAR; INTRAVENOUS at 15:21

## 2018-12-09 ASSESSMENT — ENCOUNTER SYMPTOMS
SPUTUM PRODUCTION: 1
COUGH: 1
SHORTNESS OF BREATH: 1
SORE THROAT: 1
RHINORRHEA: 1
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
## 2018-12-11 ENCOUNTER — OFFICE VISIT (OUTPATIENT)
Dept: INTERNAL MEDICINE | Facility: MEDICAL CENTER | Age: 53
End: 2018-12-11
Payer: MEDICARE

## 2018-12-11 VITALS
DIASTOLIC BLOOD PRESSURE: 84 MMHG | TEMPERATURE: 98 F | OXYGEN SATURATION: 94 % | WEIGHT: 134.4 LBS | BODY MASS INDEX: 22.39 KG/M2 | SYSTOLIC BLOOD PRESSURE: 130 MMHG | HEART RATE: 92 BPM | HEIGHT: 65 IN

## 2018-12-11 DIAGNOSIS — E10.610 TYPE 1 DIABETES MELLITUS WITH DIABETIC NEUROPATHIC ARTHROPATHY (HCC): ICD-10-CM

## 2018-12-11 DIAGNOSIS — B88.8 INFESTATION BY BED BUG: ICD-10-CM

## 2018-12-11 DIAGNOSIS — K21.9 GASTROESOPHAGEAL REFLUX DISEASE WITHOUT ESOPHAGITIS: ICD-10-CM

## 2018-12-11 DIAGNOSIS — J06.9 URTI (ACUTE UPPER RESPIRATORY INFECTION): ICD-10-CM

## 2018-12-11 DIAGNOSIS — Z23 NEED FOR INFLUENZA VACCINATION: ICD-10-CM

## 2018-12-11 DIAGNOSIS — Z00.00 ROUTINE HEALTH MAINTENANCE: ICD-10-CM

## 2018-12-11 LAB
HBA1C MFR BLD: 13.2 % (ref ?–5.8)
INT CON NEG: NORMAL
INT CON POS: NORMAL

## 2018-12-11 PROCEDURE — 99214 OFFICE O/P EST MOD 30 MIN: CPT | Mod: GC,25 | Performed by: INTERNAL MEDICINE

## 2018-12-11 PROCEDURE — 83036 HEMOGLOBIN GLYCOSYLATED A1C: CPT | Mod: GC | Performed by: INTERNAL MEDICINE

## 2018-12-11 PROCEDURE — 90686 IIV4 VACC NO PRSV 0.5 ML IM: CPT | Performed by: INTERNAL MEDICINE

## 2018-12-11 PROCEDURE — G0008 ADMIN INFLUENZA VIRUS VAC: HCPCS | Performed by: INTERNAL MEDICINE

## 2018-12-11 RX ORDER — PEN NEEDLE, DIABETIC 32GX 5/32"
NEEDLE, DISPOSABLE MISCELLANEOUS
Qty: 400 EACH | Refills: 4 | Status: SHIPPED | OUTPATIENT
Start: 2018-12-11 | End: 2018-12-21 | Stop reason: SDUPTHER

## 2018-12-11 RX ORDER — RANITIDINE 150 MG/1
150 TABLET ORAL 2 TIMES DAILY
Qty: 180 TAB | Refills: 0 | Status: SHIPPED | OUTPATIENT
Start: 2018-12-11 | End: 2019-06-14 | Stop reason: SDUPTHER

## 2018-12-11 NOTE — PROGRESS NOTES
+Established Patient    Juvenal presents today with the following:    CC:   Diabetes mellitus type 1    HPI:   53-year-old male with a past medical history of type 1 diabetes presented for a follow up visit.  Patient's last clinic appointment was in January 2018 where his diabetes was noted to be uncontrolled.  He was recommended compliance with his insulin glargine, Accu-Cheks, sliding scale, and dietary carbohydrate intake.  Over this period of time the patient has had significant financial restrictions that prevent him from getting adequate care for his illness.  He lives in a motel and reports spending most of his monthly income for housing.  Patient eats at homeless shelters mostly.  Currently reports living in poor conditions and has been exposed to bedbugs at his apartment complex.  He reports significant psychosocial distress with lack of support through friends and family.  Patient is tearful on examination but denies any thoughts of hurting himself or others.  He was recently evaluated at urgent care for the symptoms of cough, phlegm production, and exertional dyspnea.  He was diagnosed with acute bronchitis and was prescribed Solu-Medrol, Rocephin, and a 5-day course of azithromycin which he is currently completing.  Reports improvement of his exertional dyspnea with persistence of his cough. Reports improvement of his phlegm production.  Does not smoke.    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  (CMS-[facility]) 06/10/2013     Priority: Low   • History of amputation 01/30/2013     Priority: Low   • URTI (acute upper respiratory infection) 12/11/2018   • Phantom limb syndrome (HCC) 03/15/2018   • History of pneumonia 03/15/2018   • Anemia of other chronic disease 02/05/2013   • History of osteomyelitis 01/30/2013       Current Outpatient Prescriptions   Medication Sig Dispense Refill   • azithromycin (ZITHROMAX) 250 MG Tab Take 2 pills by mouth on day one, take 1 pill by mouth on days 2-5 6 Tab 0   • TOUJEO SOLOSTAR 300 UNIT/ML Solution Pen-injector INJECT 25 UNITS AS INSTRUCTED EVERY MORNING 6 PEN 0   • ONE TOUCH LANCETS Misc 1 Each by Does not apply route 5 Times a Day. For diabetes E11.9 100 Each 6   • glucose blood strip 1 Strip by Other route 5 Times a Day. One touch drum test strips for diabetes E11.9 100 Strip 6   • Blood Glucose Monitoring Suppl (ONE TOUCH ULTRA MINI) w/Device Kit 1 Each by Does not apply route 5 Times a Day. For diabetes E11.9 1 Kit 3   • HUMALOG KWIKPEN 100 UNIT/ML Solution Pen-injector injection 151-200=4 UNITS, 201-250=6 UNITS, 250-300=8 UNITS, 301-350=10 UNITS, 351-400= 12 UNITS 9 mL 0   • BD PEN NEEDLE AMY U/F 32G X 4 MM Misc USE  Each 0   • raNITidine (ZANTAC) 150 MG Tab Take 1 Tab by mouth 2 times a day. 180 Tab 0     No current facility-administered medications for this visit.        ROS: As per HPI. Additional pertinent symptoms as noted below.    As per HPI.  All others reviewed and negative.    Family History   Problem Relation Age of Onset   • Diabetes Father 73        Diabetes type II    • Heart Failure Brother 34        kidney failure secondary to type I diabetes with subsequent heart failure   • Diabetes Brother 14     Social History   Substance Use Topics   • Smoking status: Former Smoker     Years: 0.50     Quit date: 1/29/1988   • Smokeless tobacco: Never Used   • Alcohol use Yes      Comment: 4/year      Past Surgical History:   Procedure Laterality Date   •  "OTHER Bilateral 2017    cataract surgery   • CHOLECYSTECTOMY  1999   • OTHER ORTHOPEDIC SURGERY  1995    Left BKA 1995 and right foot amputation 1997   • LAPAROSCOPIC DRAINAGE PANCREATIC CYST  1995    pancreatic tumors benign removal    • EYE SURGERY      corneal transplant      Allergies: Ativan; Benadryl [altaryl]; Demerol; Fentanyl; Ibuprofen; Morphine; Other misc; Phenergan [promethazine hcl]; Reglan [metoclopramide hcl]; Toradol; and Tylenol    /84 (BP Location: Right arm, Patient Position: Sitting, BP Cuff Size: Adult)   Pulse 92   Temp 36.7 °C (98 °F) (Temporal)   Ht 1.651 m (5' 5\")   Wt 61 kg (134 lb 6.4 oz)   SpO2 94%   BMI 22.37 kg/m²     Physical Exam   Constitutional:  oriented to person, place, and time. No distress.   Eyes: Pupils are equal, round, and reactive to light. No scleral icterus.  Neck: Neck supple. No thyromegaly present.  Mild pharyngeal erythema without exudates.  No cervical lymphadenopathy noted.  Cardiovascular: Normal rate, regular rhythm and normal heart sounds.  Exam reveals no gallop and no friction rub.  No murmur heard.  Pulmonary/Chest: Scattered rhonchi noted on examination.  No wheezing or rales appreciated.  Musculoskeletal:   no edema.   Lymphadenopathy: no cervical adenopathy  Neurological: alert and oriented to person, place, and time.   Skin: No cyanosis. Nails show no clubbing.        Assessment and Plan    1. 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  Currently takes 21 units of glargine and Humalog however doubt compliance with this.  Patient pays $60 out of pocket for lancets, strips, insulin, and injection needles.  -Reports getting approximately thousand dollars per month through disability most of which is spent on his housing.  -Reports that he is unable to pay for labs as they are not covered through his insurance.  -He reports eating a diet with high amounts of simple carbohydrates provided through " the homeless shelter and food [facility]di[facility].  -Glycohemoglobin is 13.2  -Refusing atorvastatin  -Patient is agreeable to basic lab work and lipid panel/TSH done if this could be covered through his insurance.  - consult placed for assistance and programs  -Endocrinology consult placed for low cost diabetes management  -Close follow-up in 5 weeks  -Patient reports last being in ketoacidosis over 5 years ago.  Significant risks of dehydration, endorgan failure, and death related to the patient with untreated diabetes and ketoacidosis.    2. Gastroesophageal reflux disease without esophagitis  -Zantac prescribed    3. URTI (acute upper respiratory infection)  -Rhonchi noted on examination along with pharyngeal erythema without exudates  -Completing a course of azithromycin  -No significant need for mucolytic.    CBC, CMP, T4, and Lipid panel prior to next visit.     Return in about 5 weeks (around 1/15/2019).   Signed by: Manny Song M.D.

## 2018-12-11 NOTE — NON-PROVIDER
"Juvenal Boone is a 53 y.o. male here for a non-provider visit for:   FLU    Reason for immunization: Annual Flu Vaccine  Immunization records indicate need for vaccine: Yes, confirmed with Epic  Minimum interval has been met for this vaccine: Yes  ABN completed: Not Indicated    Order and dose verified by: JAMEY  VIS Dated  08/07/15 was given to patient: Yes  All IAC Questionnaire questions were answered \"No.\"    Patient tolerated injection and no adverse effects were observed or reported: Yes    Pt scheduled for next dose in series: Not Indicated    "

## 2018-12-12 ENCOUNTER — PATIENT OUTREACH (OUTPATIENT)
Dept: HEALTH INFORMATION MANAGEMENT | Facility: OTHER | Age: 53
End: 2018-12-12

## 2018-12-18 ENCOUNTER — TELEPHONE (OUTPATIENT)
Dept: INTERNAL MEDICINE | Facility: MEDICAL CENTER | Age: 53
End: 2018-12-18

## 2018-12-18 DIAGNOSIS — E10.610 TYPE 1 DIABETES MELLITUS WITH DIABETIC NEUROPATHIC ARTHROPATHY (HCC): ICD-10-CM

## 2018-12-18 NOTE — TELEPHONE ENCOUNTER
1. Caller Name: Pt                      Call Back Number: [phone] (home)     2. Message: Patient called and left a message stating all his medications got messed up.    3. Patient approves office to leave a detailed voicemail/MyChart message: N\A    Called patient and he let me know that both his Toujeo and Humalog got messed up. He only received an 80 day supply for his Toujeo and Humalog it wasn't sent correctly. He also states he asked for a refill on his muscle relaxant and that was not sent over. He would like also his glucose monitoring supplies needs to go through [facility] and so we need printed plain paper scripts for this patient so I can fax it to them

## 2018-12-21 ENCOUNTER — TELEPHONE (OUTPATIENT)
Dept: INTERNAL MEDICINE | Facility: MEDICAL CENTER | Age: 53
End: 2018-12-21

## 2018-12-21 DIAGNOSIS — E10.610 TYPE 1 DIABETES MELLITUS WITH DIABETIC NEUROPATHIC ARTHROPATHY (HCC): ICD-10-CM

## 2018-12-21 RX ORDER — CYCLOBENZAPRINE HCL 5 MG
10 TABLET ORAL 3 TIMES DAILY PRN
Qty: 30 TAB | Refills: 3 | Status: SHIPPED | OUTPATIENT
Start: 2018-12-21 | End: 2019-02-27 | Stop reason: RX

## 2018-12-21 RX ORDER — PEN NEEDLE, DIABETIC 32GX 5/32"
NEEDLE, DISPOSABLE MISCELLANEOUS
Qty: 400 EACH | Refills: 4 | Status: SHIPPED | OUTPATIENT
Start: 2018-12-21 | End: 2019-06-14 | Stop reason: SDUPTHER

## 2018-12-21 NOTE — TELEPHONE ENCOUNTER
Patient requesting for his diabetes testing supplies to be sent to [facility] [place]

## 2018-12-28 RX ORDER — BLOOD-GLUCOSE METER
1 KIT MISCELLANEOUS
Qty: 1 KIT | Refills: 3 | Status: SHIPPED | OUTPATIENT
Start: 2018-12-28 | End: 2019-02-27 | Stop reason: SDUPTHER

## 2019-01-15 ENCOUNTER — APPOINTMENT (OUTPATIENT)
Dept: INTERNAL MEDICINE | Facility: MEDICAL CENTER | Age: 54
End: 2019-01-15
Payer: MEDICARE

## 2019-02-27 ENCOUNTER — OFFICE VISIT (OUTPATIENT)
Dept: INTERNAL MEDICINE | Facility: MEDICAL CENTER | Age: 54
End: 2019-02-27
Payer: MEDICARE

## 2019-02-27 VITALS
WEIGHT: 138.6 LBS | RESPIRATION RATE: 17 BRPM | BODY MASS INDEX: 22.28 KG/M2 | HEIGHT: 66 IN | HEART RATE: 84 BPM | OXYGEN SATURATION: 97 % | DIASTOLIC BLOOD PRESSURE: 84 MMHG | SYSTOLIC BLOOD PRESSURE: 146 MMHG | TEMPERATURE: 97.5 F

## 2019-02-27 DIAGNOSIS — E10.610 TYPE 1 DIABETES MELLITUS WITH DIABETIC NEUROPATHIC ARTHROPATHY (HCC): ICD-10-CM

## 2019-02-27 PROCEDURE — 99213 OFFICE O/P EST LOW 20 MIN: CPT | Mod: GE | Performed by: INTERNAL MEDICINE

## 2019-02-27 RX ORDER — BLOOD-GLUCOSE METER
1 KIT MISCELLANEOUS
Qty: 1 KIT | Refills: 3 | Status: SHIPPED
Start: 2019-02-27 | End: 2019-03-11 | Stop reason: SDUPTHER

## 2019-02-27 ASSESSMENT — PAIN SCALES - GENERAL: PAINLEVEL: 8=MODERATE-SEVERE PAIN

## 2019-02-28 NOTE — PROGRESS NOTES
Established Patient    Juvenal presents today with the following:    CC: Refill on insulin and insulin supplies    HPI:     53-year-old male with a past medical history of diabetes on insulin who is here for refill on for his insulin and insulin supplies.  Patient wants a refill on insulin glargine and insulin lispro.  He states he has run out of supply and would want a 3-month supply.  He also wants insulin strips and glucose monitoring device.  His last A1c was 13.2 12/2018.    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 (CMS-HCC) 06/10/2013     Priority: Low   • History of amputation 01/30/2013     Priority: Low   • URTI (acute upper respiratory infection) 12/11/2018   • Phantom limb syndrome (HCC) 03/15/2018   • History of pneumonia 03/15/2018   • Anemia of other chronic disease 02/05/2013   • History of osteomyelitis 01/30/2013       Current Outpatient Prescriptions   Medication Sig Dispense Refill   • insulin lispro, Human, (HUMALOG KWIKPEN) 100 UNIT/ML Solution Pen-injector injection 151-200=4 UNITS, 201-250=6 UNITS, 250-300=8 UNITS, 301-350=10 UNITS, 351-400= 12 UNITS 9 mL 4   • glucose blood strip 1 Strip by Other route 5 Times a Day. One touch drum test strips for diabetes E11.9 100 Strip 12   • ONE TOUCH LANCETS Misc 1 Each by Does not apply route 5 Times a Day. For diabetes E11.9 100 Each 6   • Blood Glucose Monitoring Suppl (ONE TOUCH ULTRA MINI) w/Device Kit 1 Each by Does not apply route 5 Times a Day.  "For diabetes E11.9 1 Kit 3   • Insulin Glargine (TOUJEO SOLOSTAR) 300 UNIT/ML Solution Pen-injector Inject 22 Units as instructed every morning. 6 PEN 4   • BD PEN NEEDLE AMY U/F USE  Each 4   • raNITidine (ZANTAC) 150 MG Tab Take 1 Tab by mouth 2 times a day. 180 Tab 0     No current facility-administered medications for this visit.        Social History     Social History   • Marital status: Single     Spouse name: N/A   • Number of children: N/A   • Years of education: N/A     Occupational History   •        Social History Main Topics   • Smoking status: Former Smoker     Years: 0.50     Quit date: 1/29/1988   • Smokeless tobacco: Never Used   • Alcohol use Yes      Comment: 4/year    • Drug use: No   • Sexual activity: No     Other Topics Concern   • Not on file     Social History Narrative    The patient is living in [place] alone and able to ambulate without difficulty. 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        Family History   Problem Relation Age of Onset   • Diabetes Father 73        Diabetes type II    • Heart Failure Brother 34        kidney failure secondary to type I diabetes with subsequent heart failure   • Diabetes Brother 14       ROS: As per HPI. Additional pertinent systems as noted below.      /84 (BP Location: Left arm, Patient Position: Sitting, BP Cuff Size: Adult long)   Pulse 84   Temp 36.4 °C (97.5 °F) (Temporal)   Resp 17   Ht 1.676 m (5' 6\")   Wt 62.9 kg (138 lb 9.6 oz)   SpO2 97%   BMI 22.37 kg/m²     Physical Exam  General:  Alert and oriented, No apparent distress.    Eyes: Pupils equal and reactive. No scleral icterus.    Throat: Clear no erythema or exudates noted.    Neck: Supple. No lymphadenopathy noted. Thyroid not enlarged.    Lungs: Clear to auscultation and percussion bilaterally.    Cardiovascular: Regular rate and rhythm. No murmurs, rubs or gallops.    Abdomen:  Benign. No rebound " or guarding noted.    Extremities: No clubbing, cyanosis, edema.    Skin: Clear. No rash or suspicious skin lesions noted.        Note: I have reviewed all pertinent labs and diagnostic tests associated with this visit with specific comments listed under the assessment and plan below      Assessment and Plan    1. Type 1 diabetes mellitus.  -  Last A1c 13.8 12/2018.  - On insulin LantusGlargine and Lispro.  - Refilled Glargine and lispro, sent to his pharmacy.  - Patient wants refill of insulin glucose strips and glucose monitoring kit.  - Patient was given paper prescription for glucose strips and glucose monitoring kit and  Resources to care [facility]. He sates he has limited income and cannot afford to get it from his pharmacy.      Followup: No Follow-up on file.      Signed by: Bianca Pa M.D.

## 2019-03-11 DIAGNOSIS — E10.610 TYPE 1 DIABETES MELLITUS WITH DIABETIC NEUROPATHIC ARTHROPATHY (HCC): ICD-10-CM

## 2019-03-11 NOTE — TELEPHONE ENCOUNTER
Pt called stating that he went to the pharmacy and they only had one script ready for him. The front staff and myself were looking in the chart and see that only the toujeo was the only one sent to the pharmacy and the rest of the meds were printed on plain paper. Can we please send the refills electronically to the pharmacy

## 2019-03-12 RX ORDER — BLOOD-GLUCOSE METER
1 KIT MISCELLANEOUS
Qty: 1 KIT | Refills: 3 | Status: SHIPPED | OUTPATIENT
Start: 2019-03-12

## 2019-03-13 NOTE — TELEPHONE ENCOUNTER
At the last visit, patient stated his income was limited, getting his insulin meds and supplies was not affordable from the pharmacy. We gave him paper scipts and resources on where to get them at an  affordable rate.    Requested Medication has been sent to pharmacy

## 2019-05-03 ENCOUNTER — TELEPHONE (OUTPATIENT)
Dept: INTERNAL MEDICINE | Facility: MEDICAL CENTER | Age: 54
End: 2019-05-03

## 2019-05-03 RX ORDER — ATORVASTATIN CALCIUM 20 MG/1
20 TABLET, FILM COATED ORAL DAILY
Qty: 30 TAB | Refills: 1 | Status: SHIPPED | OUTPATIENT
Start: 2019-05-03

## 2019-05-06 ENCOUNTER — TELEPHONE (OUTPATIENT)
Dept: INTERNAL MEDICINE | Facility: MEDICAL CENTER | Age: 54
End: 2019-05-06

## 2019-05-06 NOTE — TELEPHONE ENCOUNTER
----- Message from Manny Song M.D. sent at 5/3/2019  4:33 PM PDT -----  This patient is an uncontrolled diabetic and does not have an appointment with us. Please have him call our clinic to make an appointment. Also please ask him to fill his atorvastatin medication from the pharmacy.

## 2019-06-13 ENCOUNTER — APPOINTMENT (OUTPATIENT)
Dept: INTERNAL MEDICINE | Facility: MEDICAL CENTER | Age: 54
End: 2019-06-13
Payer: MEDICARE

## 2019-06-14 ENCOUNTER — OFFICE VISIT (OUTPATIENT)
Dept: INTERNAL MEDICINE | Facility: MEDICAL CENTER | Age: 54
End: 2019-06-14
Payer: MEDICARE

## 2019-06-14 VITALS
HEIGHT: 66 IN | WEIGHT: 137.4 LBS | RESPIRATION RATE: 20 BRPM | HEART RATE: 86 BPM | OXYGEN SATURATION: 97 % | SYSTOLIC BLOOD PRESSURE: 138 MMHG | DIASTOLIC BLOOD PRESSURE: 76 MMHG | TEMPERATURE: 98.1 F | BODY MASS INDEX: 22.08 KG/M2

## 2019-06-14 DIAGNOSIS — K21.9 GASTROESOPHAGEAL REFLUX DISEASE WITHOUT ESOPHAGITIS: ICD-10-CM

## 2019-06-14 DIAGNOSIS — S88.912S: ICD-10-CM

## 2019-06-14 DIAGNOSIS — S88.911S: ICD-10-CM

## 2019-06-14 DIAGNOSIS — E10.610 TYPE 1 DIABETES MELLITUS WITH DIABETIC NEUROPATHIC ARTHROPATHY (HCC): ICD-10-CM

## 2019-06-14 PROBLEM — J06.9 URTI (ACUTE UPPER RESPIRATORY INFECTION): Status: RESOLVED | Noted: 2018-12-11 | Resolved: 2019-06-14

## 2019-06-14 PROCEDURE — 99213 OFFICE O/P EST LOW 20 MIN: CPT | Mod: GE | Performed by: INTERNAL MEDICINE

## 2019-06-14 RX ORDER — PEN NEEDLE, DIABETIC 32GX 5/32"
NEEDLE, DISPOSABLE MISCELLANEOUS
Qty: 400 EACH | Refills: 4 | Status: SHIPPED | OUTPATIENT
Start: 2019-06-14 | End: 2019-09-12

## 2019-06-14 RX ORDER — RANITIDINE 150 MG/1
150 TABLET ORAL 2 TIMES DAILY
Qty: 180 TAB | Refills: 0 | Status: SHIPPED | OUTPATIENT
Start: 2019-06-14 | End: 2019-09-12

## 2019-06-14 NOTE — PROGRESS NOTES
Established Patient    Juvenal presents today with the following:    CC: Medication refill.  Paperwork for new leg prosthetics.    HPI: Patient is a pleasant 53-year-old male here for medication refill for type 1 diabetes as well as for GERD.  Patient reports that his symptoms are controlled with the medications and he has no acute complaints at this time.      Patient also requested paperwork to be filled out to get new bilateral leg prosthetics as his old ones do not fit properly anymore.  Patient has bilateral amputations secondary to traumatic injury.  He requires bilateral prosthetics to walk and is independent in all ADLs and IADLs with properly fitted prosthetics.  Patient reports that due to the poor fit, he has balance issues as well as skin breakdown due to the constant friction that occurs during walking.    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 (CMS-HCC) 06/10/2013     Priority: Low   • History of amputation 01/30/2013     Priority: Low   • Phantom limb syndrome (HCC) 03/15/2018   • History of pneumonia 03/15/2018   • Anemia of other chronic disease 02/05/2013   • History of osteomyelitis 01/30/2013       Current Outpatient Prescriptions   Medication Sig Dispense Refill   • insulin lispro, Human, (HUMALOG KWIKPEN) 100 UNIT/ML Solution Pen-injector injection 151-200=4 UNITS, 201-250=6 UNITS, 250-300=8 UNITS, 301-350=10 UNITS, 351-400= 12 UNITS 9 mL 4   • Insulin Glargine (TOUJEO SOLOSTAR) 300  "UNIT/ML Solution Pen-injector Inject 22 Units as instructed every morning for 90 days. 6 PEN 4   • BD PEN NEEDLE AYM U/F USE  Each 4   • raNITidine (ZANTAC) 150 MG Tab Take 1 Tab by mouth 2 times a day for 90 days. 180 Tab 0   • atorvastatin (LIPITOR) 20 MG Tab Take 1 Tab by mouth every day. 30 Tab 1   • Blood Glucose Monitoring Suppl (ONE TOUCH ULTRA MINI) w/Device Kit Inject 1 Units as instructed 5 Times a Day. For diabetes E11.9 1 Kit 3   • glucose blood strip 1 Strip by Other route 5 Times a Day. One touch drum test strips for diabetes E11.9 100 Strip 12   • ONE TOUCH LANCETS Misc Inject 1 Each as instructed 5 Times a Day. For diabetes E11.9 100 Each 6     No current facility-administered medications for this visit.        ROS: As per HPI. Additional pertinent systems as noted below.  Constitutional: Negative for chills and fever.   HENT: Negative for ear pain and sore throat.    Eyes: Negative for discharge and redness.   Respiratory: Negative for cough, hemoptysis, wheezing and stridor.    Cardiovascular: Negative for chest pain, palpitations and leg swelling.   Gastrointestinal: Negative for abdominal pain, constipation, diarrhea, heartburn, nausea and vomiting.   Genitourinary: Negative for dysuria, flank pain and hematuria.   Musculoskeletal: Negative for falls and myalgias.   Skin: Negative for itching and rash.   Neurological: Negative for dizziness, seizures, loss of consciousness and headaches.   Endo/Heme/Allergies: Negative for polydipsia. Does not bruise/bleed easily.   Psychiatric/Behavioral: Negative for substance abuse and suicidal ideas.       /76 (BP Location: Left arm, Patient Position: Sitting, BP Cuff Size: Adult)   Pulse 86   Temp 36.7 °C (98.1 °F) (Temporal)   Resp 20   Ht 1.676 m (5' 6\")   Wt 62.3 kg (137 lb 6.4 oz)   SpO2 97%   BMI 22.18 kg/m²     Physical Exam   Constitutional:  oriented to person, place, and time. No distress.   Eyes: Pupils are equal, round, and " reactive to light. No scleral icterus.  Neck: Neck supple. No thyromegaly present.   Cardiovascular: Normal rate, regular rhythm and normal heart sounds.  Exam reveals no gallop and no friction rub.  No murmur heard.  Pulmonary/Chest: Breath sounds normal. Chest wall is not dull to percussion.   Musculoskeletal:   no edema.  Strength 5/5 in all 4 extremities.  Full range of motion of bilateral arms.  Decreased range of motion in bilateral lower extremities due to poor prostatic fit.  Patient has antalgic gait due to prostatic foot.  Lymphadenopathy: no cervical adenopathy  Neurological: alert and oriented to person, place, and time.  No gross neurologic abnormalities.  Skin: No cyanosis. Nails show no clubbing.    Note: I have reviewed all pertinent labs and diagnostic tests associated with this visit with specific comments listed under the assessment and plan below    Assessment and Plan    1. 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  Reports compliance with his medications.  Requests to 3 months supply due to better cost.  Continue glargine 22 units daily.  Continue Humalog sliding scale as prescribed.  90-day supply of medication sent to pharmacy as well as needles to use with the pens.  Monitor blood glucose and keep blood glucose log to bring to next visit for review.  Labs have been ordered for patient, but he has not been able to get them done due to poor insurance coverage and prohibitive cost.  Counseled patient on diet and exercise for better glucose control.  Patient reports that he will have the labs done by next visit once he saves money.    2. Gastroesophageal reflux disease without esophagitis  Controlled with ranitidine 150 mg twice daily.  90-day supply sent to pharmacy.    3.  Bilateral traumatic amputations  Paperwork filled out for the patient to have new prosthetics fitted to bilateral legs.  Paperwork has been faxed to provider,  copy to be scanned into chart and original given to the patient.    Followup: Return in about 3 months (around 9/14/2019), or to establish with [facility].      Signed by: Hazel Escalera M.D.

## 2019-10-10 ENCOUNTER — HOSPITAL ENCOUNTER (EMERGENCY)
Facility: MEDICAL CENTER | Age: 54
End: 2019-10-10
Attending: EMERGENCY MEDICINE
Payer: MEDICARE

## 2019-10-10 ENCOUNTER — APPOINTMENT (OUTPATIENT)
Dept: RADIOLOGY | Facility: MEDICAL CENTER | Age: 54
End: 2019-10-10
Payer: MEDICARE

## 2019-10-10 ENCOUNTER — OFFICE VISIT (OUTPATIENT)
Dept: URGENT CARE | Facility: CLINIC | Age: 54
End: 2019-10-10
Payer: MEDICARE

## 2019-10-10 ENCOUNTER — APPOINTMENT (OUTPATIENT)
Dept: RADIOLOGY | Facility: MEDICAL CENTER | Age: 54
End: 2019-10-10
Attending: EMERGENCY MEDICINE
Payer: MEDICARE

## 2019-10-10 VITALS
HEART RATE: 88 BPM | WEIGHT: 145 LBS | RESPIRATION RATE: 20 BRPM | OXYGEN SATURATION: 97 % | BODY MASS INDEX: 23.3 KG/M2 | DIASTOLIC BLOOD PRESSURE: 80 MMHG | TEMPERATURE: 97.1 F | HEIGHT: 66 IN | SYSTOLIC BLOOD PRESSURE: 132 MMHG

## 2019-10-10 VITALS
RESPIRATION RATE: 12 BRPM | HEART RATE: 85 BPM | SYSTOLIC BLOOD PRESSURE: 187 MMHG | HEIGHT: 67 IN | WEIGHT: 145.06 LBS | BODY MASS INDEX: 22.77 KG/M2 | OXYGEN SATURATION: 98 % | TEMPERATURE: 96.8 F | DIASTOLIC BLOOD PRESSURE: 99 MMHG

## 2019-10-10 DIAGNOSIS — R21 SKIN RASH: ICD-10-CM

## 2019-10-10 DIAGNOSIS — R05.9 COUGH: ICD-10-CM

## 2019-10-10 DIAGNOSIS — R60.9 SWELLING: ICD-10-CM

## 2019-10-10 PROCEDURE — 99213 OFFICE O/P EST LOW 20 MIN: CPT | Performed by: PHYSICIAN ASSISTANT

## 2019-10-10 PROCEDURE — 71045 X-RAY EXAM CHEST 1 VIEW: CPT

## 2019-10-10 PROCEDURE — 99284 EMERGENCY DEPT VISIT MOD MDM: CPT | Mod: 25

## 2019-10-10 ASSESSMENT — ENCOUNTER SYMPTOMS
MYALGIAS: 0
ABDOMINAL PAIN: 0
EYE DISCHARGE: 0
EYE REDNESS: 0
SHORTNESS OF BREATH: 0
WHEEZING: 0
HEADACHES: 1
VOMITING: 0
FEVER: 0
EDEMA: 1
JOINT SWELLING: 1
SORE THROAT: 0
NUMBNESS: 0
COUGH: 1
NAUSEA: 0
WEAKNESS: 0

## 2019-10-10 ASSESSMENT — LIFESTYLE VARIABLES: DO YOU DRINK ALCOHOL: NO

## 2019-10-10 NOTE — PROGRESS NOTES
Subjective:      Juvenal Boone is a 54 y.o. male who presents with Edema (bilat,hands,legs,face x 1 day,pt not sure if it's from a bug bite) and Cough (congestion x 4 days)        Edema   This is a new problem. The current episode started yesterday (last night). The problem occurs constantly. The problem has been unchanged. Associated symptoms include congestion, coughing, headaches and joint swelling. Pertinent negatives include no abdominal pain, chest pain, fever, myalgias, nausea, numbness, rash, sore throat, vomiting or weakness. He has tried nothing for the symptoms.   Cough   This is a new problem. Episode onset: x 4 days ago. The problem has been gradually worsening. The problem occurs constantly. The cough is productive of sputum. Associated symptoms include headaches and nasal congestion. Pertinent negatives include no chest pain, ear pain, eye redness, fever, myalgias, rash, sore throat, shortness of breath or wheezing. He has tried nothing for the symptoms.   Rash   This is a new problem. The current episode started 1 to 4 weeks ago. The problem is unchanged. The affected locations include the right shoulder, left shoulder and back. The rash is characterized by itchiness. He was exposed to an insect bite/sting. Associated symptoms include congestion, coughing and facial edema. Pertinent negatives include no fever, shortness of breath, sore throat or vomiting. Past treatments include nothing.     The patient presents to clinic complaining of diffuse swelling to his face, chest, bilateral upper extremities, including arms and hands, and bilateral lower extremities.  The patient first noticed the swelling last night.  The patient states he had difficulty putting on his prosthetics this morning due to the swelling.  The patient denies swelling to his tongue or throat.  No shortness of breath.  No chest pain.  No difficulty swallowing.  The patient also denies new exposures, including foods and  medications.  The patient reports no history of cardiac problems.  The patient has not recently taken oral steroids.  The patient is a type I diabetic.  The patient has not taken anything for his symptoms.      The patient also presents to clinic complaining of a cough x4 days.  The patient believes he may have bronchitis.  The patient states his cough is productive of sputum.  The patient reports associated nasal congestion.  He also reports a mild intermittent headache.  The patient denies fever, sore throat, ear pain, shortness of breath, wheezing, and chest pain.  The patient has not taken anything for symptoms.    The patient also notes a rash to his bilateral shoulders and back.  The patient states this has been present for a few weeks.  The patient states he first noticed the rash after moving in to a new living environment.  The patient believes the rash may be related to insect/bug bites.  The patient describes the rash as itching.  He reports no discharge/drainage from the rash.  No fever.  The patient has not taken anything for symptoms.    PMH:  has a past medical history of Benign pancreatic tumor (2010), Cataract cortical, senile, bilateral (2017), Crush injury of right leg with foot amputation (1997), Gastroparesis, GERD (gastroesophageal reflux disease), Osteomyelitis ([facility]), Traumatic amputation of bilateral legs, and Type I diabetes mellitus ([facility]).  MEDS:   Current Outpatient Medications:   •  insulin lispro, Human, (HUMALOG KWIKPEN) 100 UNIT/ML Solution Pen-injector injection, 151-200=4 UNITS, 201-250=6 UNITS, 250-300=8 UNITS, 301-350=10 UNITS, 351-400= 12 UNITS, Disp: 9 mL, Rfl: 4  •  atorvastatin (LIPITOR) 20 MG Tab, Take 1 Tab by mouth every day., Disp: 30 Tab, Rfl: 1  •  Blood Glucose Monitoring Suppl (ONE TOUCH ULTRA MINI) w/Device Kit, Inject 1 Units as instructed 5 Times a Day. For diabetes E11.9, Disp: 1 Kit, Rfl: 3  •  glucose blood strip, 1 Strip by Other route 5 Times a Day. One touch  "drum test strips for diabetes E11.9, Disp: 100 Strip, Rfl: 12  •  ONE TOUCH LANCETS Misc, Inject 1 Each as instructed 5 Times a Day. For diabetes E11.9, Disp: 100 Each, Rfl: 6  ALLERGIES:   Allergies   Allergen Reactions   • Ativan Itching   • Benadryl [Altaryl]      \"make my skin crawl\"   • Demerol    • Fentanyl Anxiety     \"I go crazy\".    • Ibuprofen      Itchy, nausea   • Morphine      \"I go in to severe anaphylactic shock, I stop breathing\".   • Other Misc      \"I'm allergic to all pain medications except dilaudid and plain oxycodone\"   • Phenergan [Promethazine Hcl]      \"make my skin crawl\"   • Reglan [Metoclopramide Hcl]      \"involuntary tongue movements\"   • Toradol Rash and Itching   • Tylenol Itching     SURGHX:   Past Surgical History:   Procedure Laterality Date   • OTHER Bilateral 2017    cataract surgery   • CHOLECYSTECTOMY  1999   • OTHER ORTHOPEDIC SURGERY  1995    Left BKA 1995 and right foot amputation 1997   • LAPAROSCOPIC DRAINAGE PANCREATIC CYST  1995    pancreatic tumors benign removal    • EYE SURGERY      corneal transplant      SOCHX:  reports that he quit smoking about 31 years ago. He quit after 0.50 years of use. He has never used smokeless tobacco. He reports that he drinks alcohol. He reports that he does not use drugs.  FH: Family history was reviewed, no pertinent findings to report      Review of Systems   Constitutional: Negative for fever.   HENT: Positive for congestion. Negative for ear pain and sore throat.    Eyes: Negative for discharge and redness.   Respiratory: Positive for cough. Negative for shortness of breath and wheezing.    Cardiovascular: Negative for chest pain and leg swelling.   Gastrointestinal: Negative for abdominal pain, nausea and vomiting.   Musculoskeletal: Positive for joint swelling. Negative for myalgias.   Skin: Negative for rash.   Neurological: Positive for headaches. Negative for weakness and numbness.          Objective:     /80 (BP " "Location: Left arm, Patient Position: Sitting)   Pulse 88   Temp 36.2 °C (97.1 °F) (Temporal)   Resp 20   Ht 1.676 m (5' 6\")   Wt 65.8 kg (145 lb)   SpO2 97%   BMI 23.40 kg/m²      Physical Exam   Constitutional: He is oriented to person, place, and time. He appears well-developed and well-nourished. No distress.   HENT:   Head: Normocephalic and atraumatic.   Right Ear: External ear normal.   Left Ear: External ear normal.   Nose: Nose normal.   Diffuse swelling of the patient's face.   No swelling of the patient's tongue/throat.   Eyes: Conjunctivae and EOM are normal.   Neck: Normal range of motion. Neck supple.   Cardiovascular: Normal rate, regular rhythm and normal heart sounds.   Pulmonary/Chest: Effort normal and breath sounds normal. No respiratory distress. He has no wheezes.   Musculoskeletal:   Left Lower Extremity:   BKA of left lower leg.  Right Lower Extremity:  Amputation of right foot.  Mild swelling of the patient's right lower leg.   Bilateral Upper Extremities:  Diffuse swelling to the patient's bilateral upper arms and hands. No pitting edema.  No erythema.  No ecchymosis.  No tenderness to palpation.  ROM intact   Neurovascular intact   Neurological: He is alert and oriented to person, place, and time.   Skin: Skin is warm and dry.   Diffuse papules to the patient's bilateral shoulders and upper back with linear tracking and intermittent excoriations.  No tenderness to palpation.  No surrounding erythema.  No increased warmth.  No discharge/drainage.  No vesicles.  No pustules.             Assessment/Plan:     1. Swelling    2. Cough    3. Skin rash    The patient's presenting symptoms and physical exam are consistent with diffuse swelling.  On physical exam, the patient had diffuse swelling of his face, bilateral upper extremities, and bilateral lower extremities.  No swelling of the patient's tongue or throat was appreciated.  The patient also reports an associated cough.  The patient " is concerned about possible bronchitis.  On physical exam, the patient's lungs were clear to auscultation without wheezing and his pulse ox was within normal limits.  The patient also reports associated skin rash to his bilateral shoulders and upper back.  On physical exam, the patient had diffuse papules to his bilateral shoulders and upper back with linear tracking and intermittent excoriations.  The patient's skin rash may be due to to scabies.  Given the patient's presenting symptoms and physical exam findings, I recommend the patient be transferred to the [facility] ED for further evaluation of his diffuse swelling.  The patient agreed to this plan.  Explained to the patient he would need further testing to identify the etiology of his current symptoms.  The patient's differential diagnosis include congestive heart failure and or kidney failure.  The patient is stable for transfer via private vehicle at this time.     Differential diagnoses, supportive care, and indications for immediate follow-up discussed with patient.   Instructed to return to clinic or nearest emergency department for any change in condition, further concerns, or worsening of symptoms.    Plan:  Transfer patient to the [facility] ED for further evaluation of his current symptoms.

## 2019-10-11 NOTE — ED TRIAGE NOTES
"Pt sent from MD office for swelling to arms, hands, face (orbital area). Pt states also chest pain and swelling started yesterday. Bilateral LE amputations due to 2 separate traumatic injuries. Pt feels as though left thigh and right LE swelling. Pt states \"my whole body feels like a sausage\".   "

## 2019-10-11 NOTE — ED NOTES
RN spoke with ED MD about this situation, MD states patient requires his full workup prior to a disposition and if he wishes to leave now he will have to leave AMA

## 2019-10-11 NOTE — ED NOTES
"RN to bedside to speak with patient, he is requesting to leave AMA because he doesn't want to pay for anymore testing.  RN provided education about testing that has already been completed, provided education on typical billing process for the AMA patient. RN offered to patient the chance to speak with the MD again and he refused. RN then offered the patient the chance to speak with Registration and/or  before he chooses to leave, he refused, stating \"I've been out since 1000 today, I just want to go home, I don't want to pay for anything else.\"  Patient agrees to stay bedside until RN speaks with MD   "

## 2019-10-11 NOTE — ED NOTES
RN bedside, provided re-inforced education to patient about his current status, the need for further testing to confirm his current medical condition was explain again. RN again offered to patient the chance to stay and he is adamantly refusing.  Patient signed AMA form and ambulated to the lobby after refusing a wheelchair

## 2019-10-11 NOTE — ED PROVIDER NOTES
ED Provider Note    Scribed for Dr. Collins Polanco M.D. by Gabby Smallwood. 10/10/2019  6:06 PM    Primary care provider: Manny Song M.D.  Means of arrival: Walk in  History obtained from: Patient   History limited by: none     CHIEF COMPLAINT  Chief Complaint   Patient presents with   • Sent by MD   • Chest Pain   • Facial Swelling       HPI  Juvenal Boone is a 54 y.o. male who presents to the Emergency Department for evaluation of worsening swelling that started yesterday afternoon. Patient reports swelling in his arms, hands, upper legs, and face. Patient also reports a cough that started four days ago. The patient endorses a similar episode of generalized swelling that occurred one year ago. He was seen in the hospital and given Lasix which resolved the swelling. At that time he was suspected to have congestive heart failure but his lab results where unremarkable. The patient denies any cardiac or kidney problems.     REVIEW OF SYSTEMS  Pertinent positives include swelling in his arms, hands, upper legs, and face, cough. Pertinent negatives include no cardiac or kidney problems. As above, all other systems reviewed and are negative.   See HPI for further details.     PAST MEDICAL HISTORY   has a past medical history of Benign pancreatic tumor (), Cataract cortical, senile, bilateral (), Crush injury of right leg with foot amputation (), Gastroparesis, GERD (gastroesophageal reflux disease), Osteomyelitis (), Traumatic amputation of bilateral legs, and Type I diabetes mellitus ().    SURGICAL HISTORY   has a past surgical history that includes cholecystectomy (); other (Bilateral, ); other orthopedic surgery (); laparoscopic drainage pancreatic cyst (); and eye surgery.    SOCIAL HISTORY  Social History     Tobacco Use   • Smoking status: Former Smoker     Years: 0.50     Last attempt to quit: 1988     Years since quittin.7   • Smokeless tobacco: Never Used  "  Substance Use Topics   • Alcohol use: Yes     Comment: 4/year    • Drug use: No      Social History     Substance and Sexual Activity   Drug Use No       FAMILY HISTORY  Family History   Problem Relation Age of Onset   • Diabetes Father 73        Diabetes type II    • Heart Failure Brother 34        kidney failure secondary to type I diabetes with subsequent heart failure   • Diabetes Brother 14       CURRENT MEDICATIONS  No current facility-administered medications on file prior to encounter.      Current Outpatient Medications on File Prior to Encounter   Medication Sig Dispense Refill   • insulin lispro, Human, (HUMALOG KWIKPEN) 100 UNIT/ML Solution Pen-injector injection 151-200=4 UNITS, 201-250=6 UNITS, 250-300=8 UNITS, 301-350=10 UNITS, 351-400= 12 UNITS 9 mL 4   • atorvastatin (LIPITOR) 20 MG Tab Take 1 Tab by mouth every day. 30 Tab 1   • Blood Glucose Monitoring Suppl (ONE TOUCH ULTRA MINI) w/Device Kit Inject 1 Units as instructed 5 Times a Day. For diabetes E11.9 1 Kit 3   • glucose blood strip 1 Strip by Other route 5 Times a Day. One touch drum test strips for diabetes E11.9 100 Strip 12   • ONE TOUCH LANCETS Misc Inject 1 Each as instructed 5 Times a Day. For diabetes E11.9 100 Each 6         ALLERGIES  Allergies   Allergen Reactions   • Ativan Itching   • Benadryl [Altaryl]      \"make my skin crawl\"   • Demerol    • Fentanyl Anxiety     \"I go crazy\".    • Ibuprofen      Itchy, nausea   • Morphine      \"I go in to severe anaphylactic shock, I stop breathing\".   • Other Misc      \"I'm allergic to all pain medications except dilaudid and plain oxycodone\"   • Phenergan [Promethazine Hcl]      \"make my skin crawl\"   • Reglan [Metoclopramide Hcl]      \"involuntary tongue movements\"   • Toradol Rash and Itching   • Tylenol Itching       PHYSICAL EXAM  VITAL SIGNS: BP (!) 187/99   Pulse 87   Temp 36 °C (96.8 °F) (Temporal)   Resp 12   Ht 1.702 m (5' 7\")   Wt 65.8 kg (145 lb 1 oz)   SpO2 100%   BMI " 22.72 kg/m²     Constitutional: Well developed, Well nourished, No distress, Non-toxic appearance.   HENT: Normocephalic, Atraumatic, Bilateral external ears normal, Oropharynx moist, No oral exudates.   Eyes: PERRLA, EOMI, Conjunctiva normal, No discharge.   Neck: No tenderness, Supple, No stridor.   Lymphatic: No lymphadenopathy noted.   Cardiovascular: Normal heart rate, Normal rhythm.   Thorax & Lungs: Clear to auscultation bilaterally, No respiratory distress, No wheezing, No crackles.   Abdomen: Soft, No tenderness, No masses, No pulsatile masses.   Skin: Warm, Dry, No erythema, No rash.   Extremities:, No edema No cyanosis. Lower extremity amputation   Musculoskeletal: No tenderness to palpation or major deformities noted.  Intact distal pulses  Neurologic: Awake, alert. Moves all extremities spontaneously.  Psychiatric: Affect normal, Judgment normal, Mood normal.      RADIOLOGY  DX-CHEST-PORTABLE (1 VIEW)   Final Result      1.  There is no acute cardiopulmonary process.        The radiologist's interpretation of all radiological studies have been reviewed by me.    COURSE & MEDICAL DECISION MAKING  Pertinent Labs & Imaging studies reviewed. (See chart for details)    6:06 PM - Patient seen and examined at bedside. I explained to the patient I will use labs and imaging to evaluate his water retention. The patient is agreeable to the plan of care. Ordered DX-Chest, proBrain Natriuretic peptide, CBC w/ differentials, CMP, Troponin to evaluate his symptoms. The differential diagnoses include but are not limited to: liver failure, renal failure, congestive heart failure    6:25 PM - Per nursing, patient no longer wishes to remain in the hospital and receive testing for his symptoms.     6:30 PM - The patient is leaving against medical advice.  I discussed with the patient the risks of leaving without receiving appropriate care to include permanent disability or death.  I have discussed possible alternatives.   The patient is not intoxicated.  The patient is a capable decision maker and understands the risks and benefits of their decision.  While I certainly disagree with the patient's decision, I respect the patient's autonomy and will not keep them here against their will.  They understand that their decision to leave can be reversed at any time and they can return to us at any time for any reason at all.      Decision Making:  I am not see any definite swelling as the patient reported nor any rash I did intended to do labs because of his complaint of diffuse swelling the patient apparently became different upset with awaiting or decided not to pursue emergent work-up and signed out AGAINST MEDICAL ADVICE I did not have the opportunity to discuss this with him prior to his leaving     The patient will return for new or worsening symptoms and is stable at the time of discharge.    The patient is referred to a primary physician for blood pressure management, diabetic screening, and for all other preventative health concerns.    DISPOSITION:  Patient is leaving against medical advice.    FINAL IMPRESSION  Chest pain  AMA     Gabby LEÓN (Celinaibserena), am scribing for, and in the presence of, Collins Polanco M.D..    Electronically signed by: Gabby Smallwood ([employer]), 10/10/2019    Collins LEÓN M.D. personally performed the services described in this documentation, as scribed by Gabby Smallwood in my presence, and it is both accurate and complete.    C    The note accurately reflects work and decisions made by me.  Collins Polanco  10/10/2019  8:40 PM